# Patient Record
Sex: FEMALE | Race: WHITE | NOT HISPANIC OR LATINO | Employment: OTHER | URBAN - METROPOLITAN AREA
[De-identification: names, ages, dates, MRNs, and addresses within clinical notes are randomized per-mention and may not be internally consistent; named-entity substitution may affect disease eponyms.]

---

## 2018-10-09 ENCOUNTER — APPOINTMENT (OUTPATIENT)
Dept: RADIOLOGY | Facility: MEDICAL CENTER | Age: 83
DRG: 193 | End: 2018-10-09
Attending: EMERGENCY MEDICINE
Payer: MEDICARE

## 2018-10-09 ENCOUNTER — APPOINTMENT (OUTPATIENT)
Dept: RADIOLOGY | Facility: MEDICAL CENTER | Age: 83
DRG: 193 | End: 2018-10-09
Attending: HOSPITALIST
Payer: MEDICARE

## 2018-10-09 ENCOUNTER — HOSPITAL ENCOUNTER (INPATIENT)
Facility: MEDICAL CENTER | Age: 83
LOS: 18 days | DRG: 193 | End: 2018-10-27
Attending: EMERGENCY MEDICINE | Admitting: HOSPITALIST
Payer: MEDICARE

## 2018-10-09 DIAGNOSIS — J18.9 PNEUMONIA OF BOTH LUNGS DUE TO INFECTIOUS ORGANISM, UNSPECIFIED PART OF LUNG: ICD-10-CM

## 2018-10-09 DIAGNOSIS — G93.40 ENCEPHALOPATHY ACUTE: ICD-10-CM

## 2018-10-09 DIAGNOSIS — Z78.9 POLST (PHYSICIAN ORDERS FOR LIFE-SUSTAINING TREATMENT): ICD-10-CM

## 2018-10-09 DIAGNOSIS — J18.9 COMMUNITY ACQUIRED PNEUMONIA OF RIGHT LUNG, UNSPECIFIED PART OF LUNG: ICD-10-CM

## 2018-10-09 DIAGNOSIS — F03.91 DEMENTIA WITH BEHAVIORAL DISTURBANCE, UNSPECIFIED DEMENTIA TYPE: ICD-10-CM

## 2018-10-09 DIAGNOSIS — J96.01 ACUTE HYPOXEMIC RESPIRATORY FAILURE (HCC): ICD-10-CM
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
## 2018-10-09 DIAGNOSIS — I10 ESSENTIAL HYPERTENSION: ICD-10-CM

## 2018-10-09 PROBLEM — F03.90 DEMENTIA (HCC): Status: ACTIVE | Noted: 2018-10-09

## 2018-10-09 LAB
ANION GAP SERPL CALC-SCNC: 10 MMOL/L (ref 0–11.9)
BASOPHILS # BLD AUTO: 0.5 % (ref 0–1.8)
BASOPHILS # BLD: 0.05 K/UL (ref 0–0.12)
BUN SERPL-MCNC: 18 MG/DL (ref 8–22)
CALCIUM SERPL-MCNC: 9.1 MG/DL (ref 8.5–10.5)
CHLORIDE SERPL-SCNC: 100 MMOL/L (ref 96–112)
CO2 SERPL-SCNC: 26 MMOL/L (ref 20–33)
CREAT SERPL-MCNC: 0.6 MG/DL (ref 0.5–1.4)
EOSINOPHIL # BLD AUTO: 0.28 K/UL (ref 0–0.51)
EOSINOPHIL NFR BLD: 3 % (ref 0–6.9)
ERYTHROCYTE [DISTWIDTH] IN BLOOD BY AUTOMATED COUNT: 44.6 FL (ref 35.9–50)
GLUCOSE SERPL-MCNC: 106 MG/DL (ref 65–99)
HCT VFR BLD AUTO: 37 % (ref 37–47)
HGB BLD-MCNC: 12.4 G/DL (ref 12–16)
IMM GRANULOCYTES # BLD AUTO: 0.12 K/UL (ref 0–0.11)
IMM GRANULOCYTES NFR BLD AUTO: 1.3 % (ref 0–0.9)
LACTATE BLD-SCNC: 1 MMOL/L (ref 0.5–2)
LYMPHOCYTES # BLD AUTO: 0.89 K/UL (ref 1–4.8)
LYMPHOCYTES NFR BLD: 9.4 % (ref 22–41)
MCH RBC QN AUTO: 29.4 PG (ref 27–33)
MCHC RBC AUTO-ENTMCNC: 33.5 G/DL (ref 33.6–35)
MCV RBC AUTO: 87.7 FL (ref 81.4–97.8)
MONOCYTES # BLD AUTO: 0.76 K/UL (ref 0–0.85)
MONOCYTES NFR BLD AUTO: 8 % (ref 0–13.4)
NEUTROPHILS # BLD AUTO: 7.39 K/UL (ref 2–7.15)
NEUTROPHILS NFR BLD: 77.8 % (ref 44–72)
NRBC # BLD AUTO: 0 K/UL
NRBC BLD-RTO: 0 /100 WBC
PLATELET # BLD AUTO: 319 K/UL (ref 164–446)
PMV BLD AUTO: 9.8 FL (ref 9–12.9)
POTASSIUM SERPL-SCNC: 3.7 MMOL/L (ref 3.6–5.5)
RBC # BLD AUTO: 4.22 M/UL (ref 4.2–5.4)
SODIUM SERPL-SCNC: 136 MMOL/L (ref 135–145)
WBC # BLD AUTO: 9.5 K/UL (ref 4.8–10.8)

## 2018-10-09 PROCEDURE — 96376 TX/PRO/DX INJ SAME DRUG ADON: CPT

## 2018-10-09 PROCEDURE — 83605 ASSAY OF LACTIC ACID: CPT

## 2018-10-09 PROCEDURE — 70450 CT HEAD/BRAIN W/O DYE: CPT

## 2018-10-09 PROCEDURE — 96375 TX/PRO/DX INJ NEW DRUG ADDON: CPT

## 2018-10-09 PROCEDURE — 96367 TX/PROPH/DG ADDL SEQ IV INF: CPT

## 2018-10-09 PROCEDURE — A9270 NON-COVERED ITEM OR SERVICE: HCPCS | Performed by: HOSPITALIST

## 2018-10-09 PROCEDURE — 700105 HCHG RX REV CODE 258: Performed by: EMERGENCY MEDICINE

## 2018-10-09 PROCEDURE — 80048 BASIC METABOLIC PNL TOTAL CA: CPT

## 2018-10-09 PROCEDURE — 99223 1ST HOSP IP/OBS HIGH 75: CPT | Mod: AI | Performed by: HOSPITALIST

## 2018-10-09 PROCEDURE — 85025 COMPLETE CBC W/AUTO DIFF WBC: CPT

## 2018-10-09 PROCEDURE — 51798 US URINE CAPACITY MEASURE: CPT

## 2018-10-09 PROCEDURE — 770001 HCHG ROOM/CARE - MED/SURG/GYN PRIV*

## 2018-10-09 PROCEDURE — 96365 THER/PROPH/DIAG IV INF INIT: CPT

## 2018-10-09 PROCEDURE — 99285 EMERGENCY DEPT VISIT HI MDM: CPT

## 2018-10-09 PROCEDURE — 700105 HCHG RX REV CODE 258: Performed by: HOSPITALIST

## 2018-10-09 PROCEDURE — 700111 HCHG RX REV CODE 636 W/ 250 OVERRIDE (IP): Performed by: EMERGENCY MEDICINE

## 2018-10-09 PROCEDURE — 71045 X-RAY EXAM CHEST 1 VIEW: CPT

## 2018-10-09 PROCEDURE — 700111 HCHG RX REV CODE 636 W/ 250 OVERRIDE (IP): Performed by: HOSPITALIST

## 2018-10-09 PROCEDURE — 700102 HCHG RX REV CODE 250 W/ 637 OVERRIDE(OP): Performed by: HOSPITALIST

## 2018-10-09 PROCEDURE — 36415 COLL VENOUS BLD VENIPUNCTURE: CPT

## 2018-10-09 PROCEDURE — 87040 BLOOD CULTURE FOR BACTERIA: CPT | Mod: 91

## 2018-10-09 RX ORDER — DIVALPROEX SODIUM 125 MG/1
250 CAPSULE, COATED PELLETS ORAL 2 TIMES DAILY
COMMUNITY

## 2018-10-09 RX ORDER — AZITHROMYCIN 500 MG/1
500 INJECTION, POWDER, LYOPHILIZED, FOR SOLUTION INTRAVENOUS ONCE
Status: COMPLETED | OUTPATIENT
Start: 2018-10-09 | End: 2018-10-09

## 2018-10-09 RX ORDER — SODIUM CHLORIDE 9 MG/ML
INJECTION, SOLUTION INTRAVENOUS CONTINUOUS
Status: DISCONTINUED | OUTPATIENT
Start: 2018-10-09 | End: 2018-10-13

## 2018-10-09 RX ORDER — LORAZEPAM 1 MG/1
0.5 TABLET ORAL EVERY 4 HOURS PRN
COMMUNITY
End: 2018-10-09

## 2018-10-09 RX ORDER — SODIUM CHLORIDE 9 MG/ML
1000 INJECTION, SOLUTION INTRAVENOUS ONCE
Status: COMPLETED | OUTPATIENT
Start: 2018-10-09 | End: 2018-10-09

## 2018-10-09 RX ORDER — DIVALPROEX SODIUM 125 MG/1
250 CAPSULE, COATED PELLETS ORAL 2 TIMES DAILY
Status: DISCONTINUED | OUTPATIENT
Start: 2018-10-09 | End: 2018-10-27 | Stop reason: HOSPADM

## 2018-10-09 RX ORDER — POLYETHYLENE GLYCOL 3350 17 G/17G
17 POWDER, FOR SOLUTION ORAL DAILY
COMMUNITY

## 2018-10-09 RX ORDER — HYDROCHLOROTHIAZIDE 25 MG/1
25 TABLET ORAL DAILY
Status: ON HOLD | COMMUNITY
End: 2018-10-27

## 2018-10-09 RX ORDER — AZITHROMYCIN 250 MG/1
250 TABLET, FILM COATED ORAL DAILY
Status: DISCONTINUED | OUTPATIENT
Start: 2018-10-10 | End: 2018-10-09

## 2018-10-09 RX ORDER — ENALAPRILAT 1.25 MG/ML
1.25 INJECTION INTRAVENOUS EVERY 6 HOURS PRN
Status: DISCONTINUED | OUTPATIENT
Start: 2018-10-09 | End: 2018-10-27 | Stop reason: HOSPADM

## 2018-10-09 RX ORDER — HYDROCODONE BITARTRATE AND ACETAMINOPHEN 5; 325 MG/1; MG/1
1 TABLET ORAL EVERY 8 HOURS PRN
Status: DISCONTINUED | OUTPATIENT
Start: 2018-10-09 | End: 2018-10-27 | Stop reason: HOSPADM

## 2018-10-09 RX ORDER — QUETIAPINE FUMARATE 25 MG/1
25 TABLET, FILM COATED ORAL EVERY 8 HOURS PRN
Status: DISCONTINUED | OUTPATIENT
Start: 2018-10-09 | End: 2018-10-27 | Stop reason: HOSPADM

## 2018-10-09 RX ORDER — POLYETHYLENE GLYCOL 3350 17 G/17G
1 POWDER, FOR SOLUTION ORAL
Status: DISCONTINUED | OUTPATIENT
Start: 2018-10-09 | End: 2018-10-27 | Stop reason: HOSPADM

## 2018-10-09 RX ORDER — LORAZEPAM 0.5 MG/1
0.5 TABLET ORAL EVERY 6 HOURS PRN
Status: DISCONTINUED | OUTPATIENT
Start: 2018-10-09 | End: 2018-10-24

## 2018-10-09 RX ORDER — ONDANSETRON 2 MG/ML
4 INJECTION INTRAMUSCULAR; INTRAVENOUS EVERY 4 HOURS PRN
Status: DISCONTINUED | OUTPATIENT
Start: 2018-10-09 | End: 2018-10-25

## 2018-10-09 RX ORDER — POLYETHYLENE GLYCOL 3350 17 G/17G
1 POWDER, FOR SOLUTION ORAL DAILY
Status: DISCONTINUED | OUTPATIENT
Start: 2018-10-09 | End: 2018-10-09

## 2018-10-09 RX ORDER — AMOXICILLIN 250 MG
2 CAPSULE ORAL 2 TIMES DAILY
Status: DISCONTINUED | OUTPATIENT
Start: 2018-10-09 | End: 2018-10-27 | Stop reason: HOSPADM

## 2018-10-09 RX ORDER — ONDANSETRON 4 MG/1
4 TABLET, ORALLY DISINTEGRATING ORAL EVERY 4 HOURS PRN
Status: DISCONTINUED | OUTPATIENT
Start: 2018-10-09 | End: 2018-10-27 | Stop reason: HOSPADM

## 2018-10-09 RX ORDER — LORAZEPAM 0.5 MG/1
0.5 TABLET ORAL EVERY 6 HOURS PRN
Status: ON HOLD | COMMUNITY
End: 2018-10-27

## 2018-10-09 RX ORDER — ACETAMINOPHEN 325 MG/1
650 TABLET ORAL EVERY 6 HOURS PRN
Status: DISCONTINUED | OUTPATIENT
Start: 2018-10-09 | End: 2018-10-27 | Stop reason: HOSPADM

## 2018-10-09 RX ORDER — BISACODYL 10 MG
10 SUPPOSITORY, RECTAL RECTAL
Status: DISCONTINUED | OUTPATIENT
Start: 2018-10-09 | End: 2018-10-27 | Stop reason: HOSPADM

## 2018-10-09 RX ORDER — HALOPERIDOL 5 MG/ML
1 INJECTION INTRAMUSCULAR EVERY 4 HOURS PRN
Status: DISCONTINUED | OUTPATIENT
Start: 2018-10-09 | End: 2018-10-25

## 2018-10-09 RX ORDER — DOCUSATE SODIUM 100 MG/1
100 CAPSULE, LIQUID FILLED ORAL 2 TIMES DAILY
COMMUNITY

## 2018-10-09 RX ORDER — ALBUTEROL SULFATE 2.5 MG/3ML
2.5 SOLUTION RESPIRATORY (INHALATION) EVERY 6 HOURS PRN
COMMUNITY

## 2018-10-09 RX ORDER — HYDROCODONE BITARTRATE AND ACETAMINOPHEN 5; 325 MG/1; MG/1
1 TABLET ORAL EVERY 8 HOURS PRN
Status: ON HOLD | COMMUNITY
End: 2018-10-27

## 2018-10-09 RX ORDER — DOCUSATE SODIUM 100 MG/1
100 CAPSULE, LIQUID FILLED ORAL 2 TIMES DAILY
Status: DISCONTINUED | OUTPATIENT
Start: 2018-10-09 | End: 2018-10-09

## 2018-10-09 RX ORDER — LANOLIN ALCOHOL/MO/W.PET/CERES
6 CREAM (GRAM) TOPICAL
Status: DISCONTINUED | OUTPATIENT
Start: 2018-10-09 | End: 2018-10-09

## 2018-10-09 RX ORDER — AMLODIPINE BESYLATE 10 MG/1
10 TABLET ORAL DAILY
Status: ON HOLD | COMMUNITY
End: 2018-10-27

## 2018-10-09 RX ORDER — LANOLIN ALCOHOL/MO/W.PET/CERES
6 CREAM (GRAM) TOPICAL
Status: ON HOLD | COMMUNITY
End: 2018-10-27

## 2018-10-09 RX ADMIN — CEFTRIAXONE SODIUM 1 G: 1 INJECTION, POWDER, FOR SOLUTION INTRAMUSCULAR; INTRAVENOUS at 07:00

## 2018-10-09 RX ADMIN — HALOPERIDOL LACTATE 1 MG: 5 INJECTION, SOLUTION INTRAMUSCULAR at 16:41

## 2018-10-09 RX ADMIN — SODIUM CHLORIDE: 9 INJECTION, SOLUTION INTRAVENOUS at 13:00

## 2018-10-09 RX ADMIN — AMPICILLIN SODIUM AND SULBACTAM SODIUM 3 G: 2; 1 INJECTION, POWDER, FOR SOLUTION INTRAMUSCULAR; INTRAVENOUS at 22:59

## 2018-10-09 RX ADMIN — HALOPERIDOL LACTATE 1 MG: 5 INJECTION, SOLUTION INTRAMUSCULAR at 09:53

## 2018-10-09 RX ADMIN — AMPICILLIN SODIUM AND SULBACTAM SODIUM 3 G: 2; 1 INJECTION, POWDER, FOR SOLUTION INTRAMUSCULAR; INTRAVENOUS at 16:30

## 2018-10-09 RX ADMIN — AZITHROMYCIN FOR INJECTION INJECTION, POWDER, LYOPHILIZED, FOR SOLUTION 500 MG: 500 INJECTION INTRAVENOUS at 18:00

## 2018-10-09 RX ADMIN — SODIUM CHLORIDE 1000 ML: 9 INJECTION, SOLUTION INTRAVENOUS at 06:00

## 2018-10-09 RX ADMIN — Medication 5 DROP: at 23:00

## 2018-10-09 ASSESSMENT — COGNITIVE AND FUNCTIONAL STATUS - GENERAL
TURNING FROM BACK TO SIDE WHILE IN FLAT BAD: A LITTLE
SUGGESTED CMS G CODE MODIFIER MOBILITY: CL
CLIMB 3 TO 5 STEPS WITH RAILING: TOTAL
EATING MEALS: A LOT
TOILETING: A LOT
MOBILITY SCORE: 11
DAILY ACTIVITIY SCORE: 12
PERSONAL GROOMING: A LOT
DRESSING REGULAR LOWER BODY CLOTHING: A LOT
WALKING IN HOSPITAL ROOM: A LOT
STANDING UP FROM CHAIR USING ARMS: TOTAL
SUGGESTED CMS G CODE MODIFIER DAILY ACTIVITY: CL
DRESSING REGULAR UPPER BODY CLOTHING: A LOT
MOVING TO AND FROM BED TO CHAIR: A LOT
MOVING FROM LYING ON BACK TO SITTING ON SIDE OF FLAT BED: A LOT
HELP NEEDED FOR BATHING: A LOT

## 2018-10-09 ASSESSMENT — PAIN SCALES - PAIN ASSESSMENT IN ADVANCED DEMENTIA (PAINAD)
TOTALSCORE: 0
BODYLANGUAGE: RELAXED
FACIALEXPRESSION: SMILING OR INEXPRESSIVE
BREATHING: NORMAL
CONSOLABILITY: NO NEED TO CONSOLE

## 2018-10-09 ASSESSMENT — COPD QUESTIONNAIRES
DO YOU EVER COUGH UP ANY MUCUS OR PHLEGM?: NO/ONLY WITH OCCASIONAL COLDS OR INFECTIONS
HAVE YOU SMOKED AT LEAST 100 CIGARETTES IN YOUR ENTIRE LIFE: NO/DON'T KNOW
COPD SCREENING SCORE: 2
DURING THE PAST 4 WEEKS HOW MUCH DID YOU FEEL SHORT OF BREATH: NONE/LITTLE OF THE TIME

## 2018-10-09 ASSESSMENT — LIFESTYLE VARIABLES: EVER_SMOKED: UNABLE TO EVALUATE AT THIS TIME - NEEDS ASSESSMENT PRIOR TO DISCHARGE

## 2018-10-09 ASSESSMENT — PAIN SCALES - GENERAL: PAINLEVEL_OUTOF10: 0

## 2018-10-09 ASSESSMENT — ENCOUNTER SYMPTOMS
SPUTUM PRODUCTION: 0
FEVER: 0
COUGH: 0
DOUBLE VISION: 0
SORE THROAT: 0
BACK PAIN: 0
HEADACHES: 0
HEARTBURN: 0
LOSS OF CONSCIOUSNESS: 0
NAUSEA: 0
PALPITATIONS: 0
CHILLS: 0
BLURRED VISION: 0
FALLS: 0
DIZZINESS: 0
ABDOMINAL PAIN: 0
SHORTNESS OF BREATH: 0

## 2018-10-09 ASSESSMENT — PAIN SCALES - WONG BAKER: WONGBAKER_NUMERICALRESPONSE: HURTS EVEN MORE
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
## 2018-10-09 NOTE — ED NOTES
"Pt lying on left side with legs extended between side rails of gurney.  Pt re-positioned on gurney.  Pt awake, no apparent respiratory distress  Pt kept saying \"please don't hurt me\"  Pt reassured multiple times that the hospital staff is not going to hurt her, that the hospital staff wants to help her get better.  Pt repeatedly removes BP cuff and disconnects pulse ox connector cable.   "

## 2018-10-09 NOTE — ASSESSMENT & PLAN NOTE
At her baseline now with dementia  Acute on chronic  CT head unrevealing  Finished antibiotics,  dced lorazepam

## 2018-10-09 NOTE — ED NOTES
Pt unbuckled posey lap belt.  Pt sitting on gurney scooting herself to end of gurney.  Pt repositioned and blanket placed under pt for comfort.  [place] lab belt reapplied to pt for pt safety due to pt confusion and continuing attempting to get out of bed.

## 2018-10-09 NOTE — ED NOTES
I talked to Charge RN for [facility] 3rd floor, updated her on pt condition and to make sure room T333-02 is appropriate for pt.  Was told that room is not appropriate due to limited line of sight. The charge RN said she would contact bed board for more appropriate bed assignment.  ER Charge RN notified.
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
## 2018-10-09 NOTE — ED NOTES
Pt squirming in bed.  Pt asked for help, when asked what the pt wanted help with pt replied she wanted bread.  Pt told their was no bread in her room at this time, pt replied she did not want bread.  Pt asked where her  was.  Pt reoriented that she is the patient.
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
## 2018-10-09 NOTE — ASSESSMENT & PLAN NOTE
Patient has POLST which states comfort measures only.  The POLST was made when patient was not capacitated.  Discussed with  who called patient's son.   He said to continue treatment with IV antibiotics and IV fluids.  Palliative consult placed to readdress this, new POLST made
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
## 2018-10-09 NOTE — DISCHARGE PLANNING
Referral: POLST Clarification    Intervention: [facility].asked by Dr. Schmidt to clarify  POLST with family.  [facility] contacted Pt son Tian Lyn  at [phone] who is listed on POLST as her DPOA. He states Pt would want to be admitted and have IV antibiotics to treat Pneumonia. [facility]ERA clarified with Dr. Schmidt who will put a consult in with Palliative Care to review POLST as there is conflicting information.     Pt currently Resides at [address]  [phone]    Plan: Pt will be admitted to hospital.

## 2018-10-09 NOTE — ED NOTES
Pt yelling in bed asking where she is.  Pt reoriented to location and situation.  Pt stated she wants to go to her room.  Pt reoriented that she is on her room.  Pt states her  and son are looking for her.  Pt reoriented that her son was with her in this room earlier today.

## 2018-10-10 LAB
ALBUMIN SERPL BCP-MCNC: 3.2 G/DL (ref 3.2–4.9)
ALBUMIN/GLOB SERPL: 1 G/DL
ALP SERPL-CCNC: 56 U/L (ref 30–99)
ALT SERPL-CCNC: 11 U/L (ref 2–50)
ANION GAP SERPL CALC-SCNC: 11 MMOL/L (ref 0–11.9)
AST SERPL-CCNC: 18 U/L (ref 12–45)
BILIRUB SERPL-MCNC: 0.6 MG/DL (ref 0.1–1.5)
BUN SERPL-MCNC: 12 MG/DL (ref 8–22)
CALCIUM SERPL-MCNC: 8.1 MG/DL (ref 8.5–10.5)
CHLORIDE SERPL-SCNC: 101 MMOL/L (ref 96–112)
CO2 SERPL-SCNC: 25 MMOL/L (ref 20–33)
CREAT SERPL-MCNC: 0.55 MG/DL (ref 0.5–1.4)
ERYTHROCYTE [DISTWIDTH] IN BLOOD BY AUTOMATED COUNT: 44.5 FL (ref 35.9–50)
GLOBULIN SER CALC-MCNC: 3.2 G/DL (ref 1.9–3.5)
GLUCOSE SERPL-MCNC: 97 MG/DL (ref 65–99)
HCT VFR BLD AUTO: 35.5 % (ref 37–47)
HGB BLD-MCNC: 11.9 G/DL (ref 12–16)
MCH RBC QN AUTO: 29.3 PG (ref 27–33)
MCHC RBC AUTO-ENTMCNC: 33.5 G/DL (ref 33.6–35)
MCV RBC AUTO: 87.4 FL (ref 81.4–97.8)
PLATELET # BLD AUTO: 287 K/UL (ref 164–446)
PMV BLD AUTO: 9.8 FL (ref 9–12.9)
POTASSIUM SERPL-SCNC: 3 MMOL/L (ref 3.6–5.5)
PROT SERPL-MCNC: 6.4 G/DL (ref 6–8.2)
RBC # BLD AUTO: 4.06 M/UL (ref 4.2–5.4)
SODIUM SERPL-SCNC: 137 MMOL/L (ref 135–145)
WBC # BLD AUTO: 7.6 K/UL (ref 4.8–10.8)

## 2018-10-10 PROCEDURE — 85027 COMPLETE CBC AUTOMATED: CPT

## 2018-10-10 PROCEDURE — 92610 EVALUATE SWALLOWING FUNCTION: CPT

## 2018-10-10 PROCEDURE — 770001 HCHG ROOM/CARE - MED/SURG/GYN PRIV*

## 2018-10-10 PROCEDURE — 700111 HCHG RX REV CODE 636 W/ 250 OVERRIDE (IP): Performed by: HOSPITALIST

## 2018-10-10 PROCEDURE — 700105 HCHG RX REV CODE 258: Performed by: HOSPITALIST

## 2018-10-10 PROCEDURE — G8997 SWALLOW GOAL STATUS: HCPCS | Mod: CJ

## 2018-10-10 PROCEDURE — 700102 HCHG RX REV CODE 250 W/ 637 OVERRIDE(OP): Performed by: HOSPITALIST

## 2018-10-10 PROCEDURE — 99232 SBSQ HOSP IP/OBS MODERATE 35: CPT | Performed by: HOSPITALIST

## 2018-10-10 PROCEDURE — 80053 COMPREHEN METABOLIC PANEL: CPT

## 2018-10-10 PROCEDURE — 36415 COLL VENOUS BLD VENIPUNCTURE: CPT

## 2018-10-10 PROCEDURE — A9270 NON-COVERED ITEM OR SERVICE: HCPCS | Performed by: HOSPITALIST

## 2018-10-10 PROCEDURE — G8996 SWALLOW CURRENT STATUS: HCPCS | Mod: CK

## 2018-10-10 RX ADMIN — SODIUM CHLORIDE: 9 INJECTION, SOLUTION INTRAVENOUS at 21:09

## 2018-10-10 RX ADMIN — SODIUM CHLORIDE: 9 INJECTION, SOLUTION INTRAVENOUS at 04:53

## 2018-10-10 RX ADMIN — ENOXAPARIN SODIUM 40 MG: 100 INJECTION SUBCUTANEOUS at 04:45

## 2018-10-10 RX ADMIN — HALOPERIDOL LACTATE 1 MG: 5 INJECTION, SOLUTION INTRAMUSCULAR at 06:38

## 2018-10-10 RX ADMIN — AMPICILLIN SODIUM AND SULBACTAM SODIUM 3 G: 2; 1 INJECTION, POWDER, FOR SOLUTION INTRAMUSCULAR; INTRAVENOUS at 09:49

## 2018-10-10 RX ADMIN — AMPICILLIN SODIUM AND SULBACTAM SODIUM 3 G: 2; 1 INJECTION, POWDER, FOR SOLUTION INTRAMUSCULAR; INTRAVENOUS at 04:49

## 2018-10-10 RX ADMIN — AZITHROMYCIN FOR INJECTION INJECTION, POWDER, LYOPHILIZED, FOR SOLUTION 500 MG: 500 INJECTION INTRAVENOUS at 18:39

## 2018-10-10 RX ADMIN — SENNOSIDES AND DOCUSATE SODIUM 2 TABLET: 8.6; 5 TABLET ORAL at 09:47

## 2018-10-10 RX ADMIN — Medication 5 DROP: at 21:16

## 2018-10-10 RX ADMIN — AMPICILLIN SODIUM AND SULBACTAM SODIUM 3 G: 2; 1 INJECTION, POWDER, FOR SOLUTION INTRAMUSCULAR; INTRAVENOUS at 17:47

## 2018-10-10 RX ADMIN — DIVALPROEX SODIUM 250 MG: 125 CAPSULE, COATED PELLETS ORAL at 18:03

## 2018-10-10 RX ADMIN — DIVALPROEX SODIUM 250 MG: 125 CAPSULE, COATED PELLETS ORAL at 09:46

## 2018-10-10 RX ADMIN — AMPICILLIN SODIUM AND SULBACTAM SODIUM 3 G: 2; 1 INJECTION, POWDER, FOR SOLUTION INTRAMUSCULAR; INTRAVENOUS at 21:09

## 2018-10-10 ASSESSMENT — PAIN SCALES - GENERAL
PAINLEVEL_OUTOF10: 0
PAINLEVEL_OUTOF10: 0

## 2018-10-10 NOTE — PROGRESS NOTES
2 RN skin check completed with CHADWICK Dan.     Bruising noted to L thigh.  Small red area L FA, blanching.    Otherwise skin normal and intact.

## 2018-10-10 NOTE — PROGRESS NOTES
Pt agitated this am. Pt yelling and trying to undo her posey belt. PRN haldol given. Attempted to reorient pt, Pt repeatedly asking about her . Pt c/o back pain, repositioned with pillow which pt states helped. [place] belt checked for appropriate fit. Will continue to monitor.

## 2018-10-10 NOTE — PROGRESS NOTES
Dr. Elrdidge notified pt has strict NPO orders with PO meds ordered. Orders to change azithromycin to IV. Per MD ok to try another beside swallow eval if pt able, SLP ordered as well. Dr. Eldridge also notified pt has not voided and was unable to get up to commode or void per bedpan, orders for bladder scan and if result >500ml to straight cath. Will continue to monitor.
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
## 2018-10-10 NOTE — CARE PLAN
Problem: Safety  Goal: Will remain free from injury  Fall precautions in place. Bed in low locked position, belongings and call light within reach. Pt instructed to use call light for assistance. Bed alarm activated, pt in desk bed.
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
## 2018-10-10 NOTE — RESPIRATORY CARE
COPD EDUCATION by COPD CLINICAL EDUCATOR  10/10/2018 at 6:56 AM by Larisa Rodrigez     Patient reviewed by COPD education team. Patient does not qualify for COPD program.

## 2018-10-10 NOTE — THERAPY
"Speech Language Therapy Clinical Swallow Evaluation completed.  Functional Status: Patient strict NPO pending evaluation. Patient awake, alert, and restless, confused, and perseverating on getting out of bed and \"Chris\" coming to visit. Patient unable to follow directives [facility], but upon inspection, patient was noted to be edentulous and have upper denture plate. Patient with no other gross deficits noted upon inspection. Patient consumed PO trials of single ice chips, NTL via cup sip, purees, pudding, and thin liquids via cup sip x1. Patient presented with subtle throat clearing on thin liquids via cup sip and then refused any further trials. No overt s/sx of aspiration were noted on ice chips, NTL, purees, or pudding. Patient declined trials of soft solids. Laryngeal elevation palpated as weak. Initiation of swallow trigger was delayed 2-3 seconds but this appeared functional and is appropriate for stated age. At this time, recommend patient start Dys1/NTL diet with 1:1 feeding. RN aware. SLP to follow. Thank you for the consult.     Recommendations - Diet: Dysphagia I, Nectar Thick Liquid                          Strategies: Strict 1:1 feeding , No Straws and Head of Bed at 90 Degrees                          Medication Administration: Crush all Medications in Puree  Plan of Care: Will benefit from Speech Therapy 3 times per week  Post-Acute Therapy: Discharge to a transitional care facility for continued speech therapy services.    See \"Rehab Therapy-Acute\" Patient Summary Report for complete documentation.   "

## 2018-10-11 LAB — PROCALCITONIN SERPL-MCNC: <0.05 NG/ML

## 2018-10-11 PROCEDURE — 99233 SBSQ HOSP IP/OBS HIGH 50: CPT | Performed by: HOSPITALIST

## 2018-10-11 PROCEDURE — 84145 PROCALCITONIN (PCT): CPT

## 2018-10-11 PROCEDURE — A9270 NON-COVERED ITEM OR SERVICE: HCPCS | Performed by: HOSPITALIST

## 2018-10-11 PROCEDURE — 92526 ORAL FUNCTION THERAPY: CPT

## 2018-10-11 PROCEDURE — 36415 COLL VENOUS BLD VENIPUNCTURE: CPT

## 2018-10-11 PROCEDURE — 700105 HCHG RX REV CODE 258: Performed by: HOSPITALIST

## 2018-10-11 PROCEDURE — 770001 HCHG ROOM/CARE - MED/SURG/GYN PRIV*

## 2018-10-11 PROCEDURE — 700102 HCHG RX REV CODE 250 W/ 637 OVERRIDE(OP): Performed by: HOSPITALIST

## 2018-10-11 PROCEDURE — 700111 HCHG RX REV CODE 636 W/ 250 OVERRIDE (IP): Performed by: HOSPITALIST

## 2018-10-11 RX ORDER — DOXYCYCLINE 100 MG/1
100 TABLET ORAL EVERY 12 HOURS
Status: DISCONTINUED | OUTPATIENT
Start: 2018-10-11 | End: 2018-10-14

## 2018-10-11 RX ADMIN — ACETAMINOPHEN 650 MG: 325 TABLET, FILM COATED ORAL at 05:53

## 2018-10-11 RX ADMIN — ENOXAPARIN SODIUM 40 MG: 100 INJECTION SUBCUTANEOUS at 04:51

## 2018-10-11 RX ADMIN — DIVALPROEX SODIUM 250 MG: 125 CAPSULE, COATED PELLETS ORAL at 05:53

## 2018-10-11 RX ADMIN — AMPICILLIN SODIUM AND SULBACTAM SODIUM 3 G: 2; 1 INJECTION, POWDER, FOR SOLUTION INTRAMUSCULAR; INTRAVENOUS at 04:50

## 2018-10-11 RX ADMIN — AMPICILLIN SODIUM AND SULBACTAM SODIUM 3 G: 2; 1 INJECTION, POWDER, FOR SOLUTION INTRAMUSCULAR; INTRAVENOUS at 10:57

## 2018-10-11 RX ADMIN — DOXYCYCLINE 100 MG: 100 TABLET ORAL at 16:14

## 2018-10-11 RX ADMIN — Medication 5 DROP: at 21:10

## 2018-10-11 ASSESSMENT — PAIN SCALES - GENERAL
PAINLEVEL_OUTOF10: 0
PAINLEVEL_OUTOF10: 0

## 2018-10-11 NOTE — THERAPY
"Speech Language Therapy dysphagia treatment completed.   Functional Status: Patient currently on Dys1/NTL diet with 1:1 feeding. Per RN, patient appears to be tolerating diet without difficulty today. Patient's son, Chris, present at bedside at beginning of session. Patient awake, alert, and intermittently agitated, but cooperative. Patient very confused and requiring redirection to task frequently. Patient consumed PO trials from lunch tray of mashed potatoes w/ gravy and NTL via cup sip. Patient consumed all PO trials with no overt s/sx of aspiration. Vocal quality remained clear and initiation of swallow trigger was timely. Patient refused trials of soft solids today. Patient was able to complete Falsetto and Effortful swallow exercises x5 each today when given a model. At this time, recommend patient continue Dys1/NTL diet with 1:1 feeding. RN aware. SLP is following for diet tolerance.     Recommendations: At this time, recommend patient continue Dys1/NTL diet with 1:1 feeding.   Plan of Care: Will benefit from Speech Therapy 3 times per week  Post-Acute Therapy: Discharge to an inpatient transitional care facility for continued speech therapy services.    See \"Rehab Therapy-Acute\" Patient Summary Report for complete documentation.     "

## 2018-10-11 NOTE — PROGRESS NOTES
· 2 RN skin check complete by Hardeep  · Devices in place N/A (pt refuses SCDs).  · Skin assessed under devices N/A.  · Confirmed pressure ulcers found on N/A  · New potential pressure ulcers noted on  Left forearm a small blanching red area noted
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
## 2018-10-11 NOTE — CARE PLAN
Problem: Safety  Goal: Will remain free from injury  Fall precautions in place. Bed in low locked position, belongings and call light within reach. Patient instructed to use call light for assistance. Hourly rounding in place. Bed alarm activated, pt in desk bed.

## 2018-10-11 NOTE — PROGRESS NOTES
"Assumed care of pt at 1900. Pt A&O x2, vss, very [place] hearing aides in place. Pt pleasant, compliant in beginning of shift. [place] lap belt in place. During the night pt very agitated, yelling \"get out you pigs\" when attempting to clean her. Pt incontinent of stool x2, up to BSC x2 assist, voiding without issues. Pt c/o back pain this am, repositioned, given tylenol. Pt tolerated medications well. Fall precautions and hourly rounding in place.   "

## 2018-10-11 NOTE — CARE PLAN
Problem: Safety  Goal: Will remain free from injury  Outcome: PROGRESSING AS EXPECTED  Pt remains free from falls or injury this shift. Continue to assess risk and implement precautions as needed    Problem: Knowledge Deficit  Goal: Knowledge of disease process/condition, treatment plan, diagnostic tests, and medications will improve  Outcome: PROGRESSING SLOWER THAN EXPECTED  Pt does not process info about disease process or treatment plan and does not show signs of effective learning. Continue to educate on care plan and disease process

## 2018-10-12 LAB
ANION GAP SERPL CALC-SCNC: 9 MMOL/L (ref 0–11.9)
BUN SERPL-MCNC: 7 MG/DL (ref 8–22)
CALCIUM SERPL-MCNC: 8.6 MG/DL (ref 8.5–10.5)
CHLORIDE SERPL-SCNC: 100 MMOL/L (ref 96–112)
CO2 SERPL-SCNC: 27 MMOL/L (ref 20–33)
CREAT SERPL-MCNC: 0.48 MG/DL (ref 0.5–1.4)
GLUCOSE SERPL-MCNC: 106 MG/DL (ref 65–99)
POTASSIUM SERPL-SCNC: 2.8 MMOL/L (ref 3.6–5.5)
SODIUM SERPL-SCNC: 136 MMOL/L (ref 135–145)

## 2018-10-12 PROCEDURE — G8979 MOBILITY GOAL STATUS: HCPCS | Mod: CJ

## 2018-10-12 PROCEDURE — 97162 PT EVAL MOD COMPLEX 30 MIN: CPT

## 2018-10-12 PROCEDURE — 36415 COLL VENOUS BLD VENIPUNCTURE: CPT

## 2018-10-12 PROCEDURE — G8987 SELF CARE CURRENT STATUS: HCPCS | Mod: CM

## 2018-10-12 PROCEDURE — 700102 HCHG RX REV CODE 250 W/ 637 OVERRIDE(OP): Performed by: HOSPITALIST

## 2018-10-12 PROCEDURE — A9270 NON-COVERED ITEM OR SERVICE: HCPCS | Performed by: HOSPITALIST

## 2018-10-12 PROCEDURE — 99232 SBSQ HOSP IP/OBS MODERATE 35: CPT | Performed by: HOSPITALIST

## 2018-10-12 PROCEDURE — 700111 HCHG RX REV CODE 636 W/ 250 OVERRIDE (IP): Performed by: HOSPITALIST

## 2018-10-12 PROCEDURE — 97166 OT EVAL MOD COMPLEX 45 MIN: CPT

## 2018-10-12 PROCEDURE — G8978 MOBILITY CURRENT STATUS: HCPCS | Mod: CM

## 2018-10-12 PROCEDURE — 770001 HCHG ROOM/CARE - MED/SURG/GYN PRIV*

## 2018-10-12 PROCEDURE — G8988 SELF CARE GOAL STATUS: HCPCS | Mod: CK

## 2018-10-12 PROCEDURE — 80048 BASIC METABOLIC PNL TOTAL CA: CPT

## 2018-10-12 RX ORDER — HALOPERIDOL 1 MG/1
0.5 TABLET ORAL ONCE
Status: COMPLETED | OUTPATIENT
Start: 2018-10-12 | End: 2018-10-12

## 2018-10-12 RX ORDER — POTASSIUM CHLORIDE 7.45 MG/ML
10 INJECTION INTRAVENOUS
Status: COMPLETED | OUTPATIENT
Start: 2018-10-12 | End: 2018-10-12

## 2018-10-12 RX ORDER — POTASSIUM CHLORIDE 20 MEQ/1
20 TABLET, EXTENDED RELEASE ORAL 2 TIMES DAILY
Status: COMPLETED | OUTPATIENT
Start: 2018-10-12 | End: 2018-10-14

## 2018-10-12 RX ADMIN — POTASSIUM CHLORIDE 10 MEQ: 7.46 INJECTION, SOLUTION INTRAVENOUS at 02:14

## 2018-10-12 RX ADMIN — HALOPERIDOL LACTATE 1 MG: 5 INJECTION, SOLUTION INTRAMUSCULAR at 01:11

## 2018-10-12 RX ADMIN — DIVALPROEX SODIUM 250 MG: 125 CAPSULE, COATED PELLETS ORAL at 17:09

## 2018-10-12 RX ADMIN — DOXYCYCLINE 100 MG: 100 TABLET ORAL at 17:09

## 2018-10-12 RX ADMIN — ENALAPRILAT 1.25 MG: 1.25 INJECTION INTRAVENOUS at 04:50

## 2018-10-12 RX ADMIN — POTASSIUM CHLORIDE 10 MEQ: 7.46 INJECTION, SOLUTION INTRAVENOUS at 16:02

## 2018-10-12 RX ADMIN — POTASSIUM CHLORIDE 10 MEQ: 7.46 INJECTION, SOLUTION INTRAVENOUS at 03:33

## 2018-10-12 RX ADMIN — HALOPERIDOL 0.5 MG: 1 TABLET ORAL at 12:37

## 2018-10-12 RX ADMIN — POTASSIUM CHLORIDE 10 MEQ: 7.46 INJECTION, SOLUTION INTRAVENOUS at 17:07

## 2018-10-12 RX ADMIN — POTASSIUM CHLORIDE 20 MEQ: 1500 TABLET, EXTENDED RELEASE ORAL at 17:09

## 2018-10-12 RX ADMIN — Medication 5 DROP: at 21:00

## 2018-10-12 RX ADMIN — POTASSIUM CHLORIDE 10 MEQ: 7.46 INJECTION, SOLUTION INTRAVENOUS at 18:21

## 2018-10-12 RX ADMIN — ENOXAPARIN SODIUM 40 MG: 100 INJECTION SUBCUTANEOUS at 05:01

## 2018-10-12 RX ADMIN — POTASSIUM CHLORIDE 10 MEQ: 7.46 INJECTION, SOLUTION INTRAVENOUS at 17:00

## 2018-10-12 ASSESSMENT — COGNITIVE AND FUNCTIONAL STATUS - GENERAL
MOVING TO AND FROM BED TO CHAIR: UNABLE
DRESSING REGULAR LOWER BODY CLOTHING: TOTAL
DAILY ACTIVITIY SCORE: 6
HELP NEEDED FOR BATHING: TOTAL
EATING MEALS: TOTAL
MOBILITY SCORE: 6
SUGGESTED CMS G CODE MODIFIER DAILY ACTIVITY: CN
CLIMB 3 TO 5 STEPS WITH RAILING: TOTAL
SUGGESTED CMS G CODE MODIFIER MOBILITY: CN
WALKING IN HOSPITAL ROOM: TOTAL
STANDING UP FROM CHAIR USING ARMS: TOTAL
TOILETING: TOTAL
DRESSING REGULAR UPPER BODY CLOTHING: TOTAL
TURNING FROM BACK TO SIDE WHILE IN FLAT BAD: UNABLE
PERSONAL GROOMING: TOTAL
MOVING FROM LYING ON BACK TO SITTING ON SIDE OF FLAT BED: UNABLE

## 2018-10-12 ASSESSMENT — PATIENT HEALTH QUESTIONNAIRE - PHQ9
SUM OF ALL RESPONSES TO PHQ9 QUESTIONS 1 AND 2: 0
1. LITTLE INTEREST OR PLEASURE IN DOING THINGS: NOT AT ALL
2. FEELING DOWN, DEPRESSED, IRRITABLE, OR HOPELESS: NOT AT ALL

## 2018-10-12 ASSESSMENT — PAIN SCALES - GENERAL
PAINLEVEL_OUTOF10: 0
PAINLEVEL_OUTOF10: 0

## 2018-10-12 ASSESSMENT — ACTIVITIES OF DAILY LIVING (ADL): TOILETING: REQUIRES ASSIST

## 2018-10-12 ASSESSMENT — GAIT ASSESSMENTS: GAIT LEVEL OF ASSIST: UNABLE TO PARTICIPATE

## 2018-10-12 NOTE — PROGRESS NOTES
"Pt. Received from day shift, pt. Is awake and alert. Refuses to answer orientation questions, verbally aggressive towards this Rn stating \"you don't do anything for me, get me a nurse\" Reoriented pt.about the unit but still kept yelling \"nurse, nurse nurse\". Pt. Refused to take her medications for tonight, and did not want to eat her dinner tray also. Pt. Noted to be on a lap belt, needs attended.    "

## 2018-10-12 NOTE — CARE PLAN
Problem: Infection  Goal: Will remain free from infection  Outcome: PROGRESSING AS EXPECTED  Administered scheduled IV antibiotic. No signs of worsening infection at this time.    Problem: Skin Integrity  Goal: Risk for impaired skin integrity will decrease  Outcome: PROGRESSING AS EXPECTED  Q2 turns. Checking frequently for incontinence.

## 2018-10-12 NOTE — DISCHARGE PLANNING
Anticipated Discharge Disposition: Home?    Action: Discussed patient's needs during IDT rounds.  Patient is in soft restraints after pulling her IV out, continues to be disorientated.  PT/OT has been ordered    Barriers to Discharge: medical clearance and plan of care    Plan: follow up with family

## 2018-10-12 NOTE — PROGRESS NOTES
"Received bedside report from night shift RN. Pt A&O x2 (disorientated to event and time). Pt on soft wrist restraints 2 hr checks provided. Bed alarm on. Pt denies pain or discomfort at this moment. Pt mentioned, \"Why do I have these on? Can you cut them?\" RN and SN educated pt the reason soft wrist restraints were place on. Assessment performed. Bed locked and lowest position, treaded socks on, and call light within reach. Hourly rounding in place.   "

## 2018-10-12 NOTE — CARE PLAN
Problem: Communication  Goal: The ability to communicate needs accurately and effectively will improve  Outcome: PROGRESSING AS EXPECTED  Pt encouraged to voice concerns and feeling, hourly rounding in place.     Problem: Skin Integrity  Goal: Risk for impaired skin integrity will decrease  Outcome: PROGRESSING AS EXPECTED  Pt is Q2h turns, soft wrist restraint Q2h checks in place.

## 2018-10-12 NOTE — CARE PLAN
Problem: Safety  Goal: Will remain free from falls  Outcome: PROGRESSING AS EXPECTED  Bed alarm in place, call light placed within reach. Pt. Is on a desk bed.     Problem: Bowel/Gastric:  Goal: Normal bowel function is maintained or improved  Outcome: PROGRESSING AS EXPECTED  Noted to have loose stools but otherwise did not look like anything concerning, held her stool softeners for today.     Problem: Psychosocial Needs:  Goal: Level of anxiety will decrease  Outcome: PROGRESSING AS EXPECTED  Reoriented pt. Provided a listening ear to pt. Concerns, tried to calm her down.

## 2018-10-12 NOTE — THERAPY
"Occupational Therapy Evaluation completed.   Functional Status:  Max-total assist w/all activity, pt was AOx1, incontinent in bed, total assist w/selene care, impaired by cognition. Sat EOB, required total assist w/stand and assist BTB   Plan of Care: Will benefit from Occupational Therapy 2 times per week  Discharge Recommendations:  Equipment: Will Continue to Assess for Equipment Needs. Post-acute therapy Discharge to a transitional care facility for continued skilled therapy services.    See \"Rehab Therapy-Acute\" Patient Summary Report for complete documentation.      89 yr old female admitted for GLF, hx of advanced dementia, found to have PNA. Pt appears to have generalized weakness, and worsening confusion. Son was present and reports pt is not at PLOF, current recommend post acute placement prior to d/c home   "

## 2018-10-12 NOTE — CARE PLAN
Problem: Safety  Goal: Will remain free from injury  Outcome: PROGRESSING AS EXPECTED  Bilateral restraints on the wrists applied to help her prevent injury as she is trying to pull out her PIV. Coban placed on the IV, MD updated and restraints applied per MD order.

## 2018-10-12 NOTE — THERAPY
"Physical Therapy Evaluation completed.   Bed Mobility:  Supine to Sit: Total Assist 2/2 initiation, assisting with LEs  Transfers: Sit to Stand: Total Assist X 2  Gait: Level Of Assist: Unable to Participate     Plan of Care: Will benefit from Physical Therapy 3 times per week  Discharge Recommendations: Equipment: Will Continue to Assess for Equipment Needs.     Pt presents with impaired command following, dynamic balance and motor planning associated with acute on chronic cognitive deficits with hx of dementia. Pt very pleasant and agreeable this session; short term memory deficits impeding initiation and carryover but son at bedside reporting she was ambulatory with a FWW at [facility] which she is not able to currently demonstrate. Pt would benefit from placement after hospital discharge to return to ambulatory baseline;    See \"Rehab Therapy-Acute\" Patient Summary Report for complete documentation.     "

## 2018-10-12 NOTE — PROGRESS NOTES
"Pt. Resting at this time, but still screams and yells \"nurse, come here, come here. Untie me, I can't breathe\" reassessed lap belt restraints and appears to be actually looser. Turned pt. And cleaned her up as she soiled the linen. CNA and RN changed the pt.   "

## 2018-10-12 NOTE — PROGRESS NOTES
AAOx1, self - dementia. Declining pain intervention at this time. +BS in all 4 quadrants, hyperactive. Lap belt in use, restraint order current. 2x person assist, unsteady. Incontinent. POC discussed, denies further needs at this time. Bed alarm on, call light within reach & hourly rounding in place.

## 2018-10-12 NOTE — PROGRESS NOTES
"Pt. Noted to be up and awake, started to be taking off the coban of the IV that was placed. When RN asked why she was taking it off she said \"I'm hurting\". Coban was loosened but then pt. Still kept taking off coban and pulling the IV tubing despite reorientation and education. MD updated and received order for restraints on both wrists.   "

## 2018-10-12 NOTE — PROGRESS NOTES
"Pt. Still restless, pulled out PIV on her L upper arm, changed her linens. Still verbally aggressive towards this RN stating \" You don't know what you're doing, get out of here get me a nurse\".   "

## 2018-10-13 PROBLEM — E87.6 HYPOKALEMIA DUE TO INADEQUATE POTASSIUM INTAKE: Status: ACTIVE | Noted: 2018-10-13

## 2018-10-13 LAB
ANION GAP SERPL CALC-SCNC: 8 MMOL/L (ref 0–11.9)
BUN SERPL-MCNC: 8 MG/DL (ref 8–22)
CALCIUM SERPL-MCNC: 8.4 MG/DL (ref 8.5–10.5)
CHLORIDE SERPL-SCNC: 106 MMOL/L (ref 96–112)
CO2 SERPL-SCNC: 25 MMOL/L (ref 20–33)
CREAT SERPL-MCNC: 0.48 MG/DL (ref 0.5–1.4)
GLUCOSE SERPL-MCNC: 94 MG/DL (ref 65–99)
MAGNESIUM SERPL-MCNC: 1.7 MG/DL (ref 1.5–2.5)
POTASSIUM SERPL-SCNC: 3.5 MMOL/L (ref 3.6–5.5)
SODIUM SERPL-SCNC: 139 MMOL/L (ref 135–145)

## 2018-10-13 PROCEDURE — 36415 COLL VENOUS BLD VENIPUNCTURE: CPT

## 2018-10-13 PROCEDURE — A9270 NON-COVERED ITEM OR SERVICE: HCPCS | Performed by: HOSPITALIST

## 2018-10-13 PROCEDURE — 700102 HCHG RX REV CODE 250 W/ 637 OVERRIDE(OP): Performed by: HOSPITALIST

## 2018-10-13 PROCEDURE — 83735 ASSAY OF MAGNESIUM: CPT

## 2018-10-13 PROCEDURE — 700105 HCHG RX REV CODE 258: Performed by: HOSPITALIST

## 2018-10-13 PROCEDURE — 80048 BASIC METABOLIC PNL TOTAL CA: CPT

## 2018-10-13 PROCEDURE — 99233 SBSQ HOSP IP/OBS HIGH 50: CPT | Performed by: HOSPITALIST

## 2018-10-13 PROCEDURE — 770001 HCHG ROOM/CARE - MED/SURG/GYN PRIV*

## 2018-10-13 PROCEDURE — 700111 HCHG RX REV CODE 636 W/ 250 OVERRIDE (IP): Performed by: HOSPITALIST

## 2018-10-13 RX ORDER — POLYVINYL ALCOHOL 14 MG/ML
1 SOLUTION/ DROPS OPHTHALMIC
Status: DISCONTINUED | OUTPATIENT
Start: 2018-10-13 | End: 2018-10-13

## 2018-10-13 RX ADMIN — DIVALPROEX SODIUM 250 MG: 125 CAPSULE, COATED PELLETS ORAL at 18:09

## 2018-10-13 RX ADMIN — DIVALPROEX SODIUM 250 MG: 125 CAPSULE, COATED PELLETS ORAL at 09:12

## 2018-10-13 RX ADMIN — SENNOSIDES AND DOCUSATE SODIUM 2 TABLET: 8.6; 5 TABLET ORAL at 18:09

## 2018-10-13 RX ADMIN — POTASSIUM CHLORIDE 20 MEQ: 1500 TABLET, EXTENDED RELEASE ORAL at 09:13

## 2018-10-13 RX ADMIN — ENOXAPARIN SODIUM 40 MG: 100 INJECTION SUBCUTANEOUS at 09:13

## 2018-10-13 RX ADMIN — DOXYCYCLINE 100 MG: 100 TABLET ORAL at 18:09

## 2018-10-13 RX ADMIN — DOXYCYCLINE 100 MG: 100 TABLET ORAL at 09:12

## 2018-10-13 RX ADMIN — SODIUM CHLORIDE: 9 INJECTION, SOLUTION INTRAVENOUS at 04:31

## 2018-10-13 RX ADMIN — HYDROCODONE BITARTRATE AND ACETAMINOPHEN 1 TABLET: 5; 325 TABLET ORAL at 19:57

## 2018-10-13 RX ADMIN — Medication 5 DROP: at 21:00

## 2018-10-13 RX ADMIN — POTASSIUM CHLORIDE 20 MEQ: 1500 TABLET, EXTENDED RELEASE ORAL at 18:09

## 2018-10-13 ASSESSMENT — PAIN SCALES - GENERAL
PAINLEVEL_OUTOF10: 5
PAINLEVEL_OUTOF10: 0
PAINLEVEL_OUTOF10: 0
PAINLEVEL_OUTOF10: 5

## 2018-10-13 ASSESSMENT — PAIN SCALES - PAIN ASSESSMENT IN ADVANCED DEMENTIA (PAINAD)
BODYLANGUAGE: RELAXED
CONSOLABILITY: NO NEED TO CONSOLE
FACIALEXPRESSION: SMILING OR INEXPRESSIVE
BREATHING: NORMAL
BODYLANGUAGE: RELAXED
BREATHING: NORMAL
TOTALSCORE: 0
TOTALSCORE: 0
FACIALEXPRESSION: SMILING OR INEXPRESSIVE
CONSOLABILITY: NO NEED TO CONSOLE

## 2018-10-13 NOTE — PROGRESS NOTES
Patient's bed is not lowering. Patient is on restraints sleeping well. Staff in charge of fixing bed contacted and made aware.

## 2018-10-13 NOTE — CARE PLAN
Problem: Safety  Goal: Will remain free from injury  Outcome: PROGRESSING AS EXPECTED  Fall precautions in place. Treaded socks on pt. Appropriate signs on doorway. IV pole on same side as bathroom. Bedrails up. Bed in lowest position and locked.  Call light and phone within reach. Patient educated on importance of calling nurses before getting out of bed, verbalizes understanding. Bed alarm on.    Problem: Skin Integrity  Goal: Risk for impaired skin integrity will decrease  Outcome: PROGRESSING AS EXPECTED  q2hr turns in place. Pt was cleaned during incontinent episodes, and soiled linen removed promptly. Made sure pt's skin remained dry and intact during shift.

## 2018-10-13 NOTE — PROGRESS NOTES
[facility] Medicine Daily Progress Note    Date of Service  10/12/2018    Chief Complaint  89 y.o. female admitted 10/9/2018 with dementia admitted after a GLF at nursing and found to have RLL infiltrate on CXR, admitted for further treatment.     Interval Problem Update  10/10--patient sleeping soundly in bed, awakes to voice.  Unintelligible speech, reports being hard of hearing and references family members not in room.  Denies chest pain or shortness of breath, although oriented x0.  Reports some RN about patient being agitated overnight and in the morning trying to undo [place] belt  10/11--patient remains about the same, maybe a little more cooperative today.  Palliative care spoke with son, changes to post and CODE STATUS made.    Consultants/Specialty  Palliative Care    Code Status  DNR/DNI however wanting treatment options, this is new change    Disposition  Pending, will discuss this a.m. around, will need PT and OT evaluation for possible SNF placement    Review of Systems  Review of Systems   Unable to perform ROS: Dementia        Physical Exam  Temp:  [36.6 °C (97.8 °F)-37.3 °C (99.2 °F)] 37.3 °C (99.2 °F)  Pulse:  [67-83] 71  Resp:  [17-18] 18  BP: (148-185)/(66-94) 158/70    Physical Exam   Constitutional: No distress.   HENT:   Head: Normocephalic.   Mouth/Throat: Oropharynx is clear and moist.   Eyes: Conjunctivae are normal. No scleral icterus.   Neck: Normal range of motion.   Cardiovascular: Normal heart sounds and intact distal pulses.    Pulmonary/Chest: Effort normal. No stridor. No respiratory distress. She has no wheezes.   Abdominal: Soft. She exhibits no distension. There is no guarding.   Musculoskeletal: She exhibits no edema, tenderness or deformity.   Neurological: She is alert.   Skin: Skin is warm and dry. No rash noted. She is not diaphoretic. No erythema.   Nursing note and vitals reviewed.      Fluids    Intake/Output Summary (Last 24 hours) at 10/12/18 1236  Last data filed at  10/12/18 1400   Gross per 24 hour   Intake               50 ml   Output                0 ml   Net               50 ml       Laboratory  Recent Labs      10/10/18   0047   WBC  7.6   RBC  4.06*   HEMOGLOBIN  11.9*   HEMATOCRIT  35.5*   MCV  87.4   MCH  29.3   MCHC  33.5*   RDW  44.5   PLATELETCT  287   MPV  9.8     Recent Labs      10/10/18   0047  10/12/18   1133   SODIUM  137  136   POTASSIUM  3.0*  2.8*   CHLORIDE  101  100   CO2  25  27   GLUCOSE  97  106*   BUN  12  7*   CREATININE  0.55  0.48*   CALCIUM  8.1*  8.6                   Imaging  CT-HEAD W/O   Final Result      No definite acute intracranial abnormality is identified.      Atrophy      There are periventricular and subcortical white matter changes present.  This finding is nonspecific and could be from previous small vessel ischemia, demyelination, or gliosis.      DX-CHEST-PORTABLE (1 VIEW)   Final Result         1.  Hazy right infrahilar density suggests subtle infiltrate.   2.  Cardiomegaly   3.  Atherosclerosis           Assessment/Plan  * Community acquired pneumonia of right lung   Assessment & Plan    Chest x-ray reviewed by me showing increased right infrahilar consolidation  Patient requiring supplemental oxygen  Started on Unasyn and azithromycin as cannot rule out possible aspiration pneumonia considering patient's mental status  Started on IV fluids, RT protocol  Pro-calcitonin negative, lactic acid normal  P.o. intake improving, will transition to p.o. antibiotics with doxycycline and date 10/14/2018        POLST (Physician Orders for Life-Sustaining Treatment)- (present on admission)   Assessment & Plan    Patient has POLST which states comfort measures only.  The POLST was made when patient was not capacitated.  Discussed with  who called patient's son.   He said to continue treatment with IV antibiotics and IV fluids.  Palliative consult placed to readdress this, new POLST made        Fall from ground level   Assessment &  Plan    Patient was fighting with another resident at nursing facility for a walker  This resulted in ground-level fall  CT head negative for bleed        Encephalopathy acute   Assessment & Plan    Patient does have underlying dementia but appears to be more confused  Likely due to pneumonia  CT head unrevealing        Acute hypoxemic respiratory failure (HCC)- (present on admission)   Assessment & Plan    Currently requiring supplemental oxygen due to above but improving  Continue RT protocol and antibiotics        Dementia- (present on admission)   Assessment & Plan    Unclear of patient's baseline        Essential hypertension- (present on admission)   Assessment & Plan    Holding patient's amlodipine and hydrochlorothiazide due to borderline low blood pressure and in setting of infection.  Resume when tolerated.  As needed BP med orders in place             VTE prophylaxis: Heparin

## 2018-10-13 NOTE — PROGRESS NOTES
Pt refusing 2 RN skin assessment at this time. Educated pt of the need to assess her skin every shift to prevent pressure ulcers as she scored a 15 on the akila score assessment scale. Pt verbally understands the risks and does not want to woken up at this time.

## 2018-10-13 NOTE — PROGRESS NOTES
2RN skin assessment completed with CHADWICK Juarez. Patient presents slight redness on coccyx area that is blanching. Bruising in BUE and BLE. Otherwise skin is intact. Patient is on restraints, skin assessed under restraints and it looks intact. No signs of injury noted.

## 2018-10-13 NOTE — PROGRESS NOTES
[facility] Medicine Daily Progress Note    Date of Service  10/13/2018    Chief Complaint  89 y.o. female admitted 10/9/2018 with dementia admitted after a GLF at nursing and found to have RLL infiltrate on CXR, admitted for further treatment.     Interval Problem Update  10/10--patient sleeping soundly in bed, awakes to voice.  Unintelligible speech, reports being hard of hearing and references family members not in room.  Denies chest pain or shortness of breath, although oriented x0.  Reports some RN about patient being agitated overnight and in the morning trying to undo [place] belt  10/11--patient remains about the same, maybe a little more cooperative today.  Palliative care spoke with son, changes to post and CODE STATUS made.    Consultants/Specialty  Palliative Care    Code Status  DNR/DNI however wanting treatment options, this is new change    Disposition  Pending, will discuss this a.m. around, will need PT and OT evaluation for possible SNF placement    Review of Systems  Review of Systems   Unable to perform ROS: Dementia        Physical Exam  Temp:  [36.6 °C (97.8 °F)-37.3 °C (99.2 °F)] 36.7 °C (98.1 °F)  Pulse:  [62-71] 62  Resp:  [17-20] 20  BP: (112-158)/(43-70) 151/54    Physical Exam   Constitutional: No distress.   HENT:   Head: Normocephalic.   Mouth/Throat: Oropharynx is clear and moist.   Eyes: Conjunctivae are normal. No scleral icterus.   Neck: Normal range of motion.   Cardiovascular: Normal heart sounds and intact distal pulses.    Pulmonary/Chest: Effort normal. No stridor. No respiratory distress. She has no wheezes.   Abdominal: Soft. She exhibits no distension. There is no tenderness.   Musculoskeletal: She exhibits no edema, tenderness or deformity.   Neurological: She is alert. No cranial nerve deficit.   Skin: Skin is warm and dry. She is not diaphoretic. No erythema.   Psychiatric:   Angry, oriented x0   Nursing note and vitals reviewed.      Fluids    Intake/Output Summary (Last 24  hours) at 10/13/18 1111  Last data filed at 10/13/18 0901   Gross per 24 hour   Intake              650 ml   Output                0 ml   Net              650 ml       Laboratory      Recent Labs      10/12/18   1133  10/13/18   0807   SODIUM  136  139   POTASSIUM  2.8*  3.5*   CHLORIDE  100  106   CO2  27  25   GLUCOSE  106*  94   BUN  7*  8   CREATININE  0.48*  0.48*   CALCIUM  8.6  8.4*                   Imaging  CT-HEAD W/O   Final Result      No definite acute intracranial abnormality is identified.      Atrophy      There are periventricular and subcortical white matter changes present.  This finding is nonspecific and could be from previous small vessel ischemia, demyelination, or gliosis.      DX-CHEST-PORTABLE (1 VIEW)   Final Result         1.  Hazy right infrahilar density suggests subtle infiltrate.   2.  Cardiomegaly   3.  Atherosclerosis           Assessment/Plan  * Community acquired pneumonia of right lung   Assessment & Plan    Chest x-ray reviewed by me showing increased right infrahilar consolidation  Patient requiring supplemental oxygen  Started on Unasyn and azithromycin as cannot rule out possible aspiration pneumonia considering patient's mental status  Started on IV fluids, RT protocol  Pro-calcitonin negative, lactic acid normal  P.o. intake improving, will transition to p.o. antibiotics with doxycycline and date 10/14/2018        POLST (Physician Orders for Life-Sustaining Treatment)- (present on admission)   Assessment & Plan    Patient has POLST which states comfort measures only.  The POLST was made when patient was not capacitated.  Discussed with  who called patient's son.   He said to continue treatment with IV antibiotics and IV fluids.  Palliative consult placed to readdress this, new POLST made        Fall from ground level- (present on admission)   Assessment & Plan    Patient was fighting with another resident at nursing facility for a walker  This resulted in  ground-level fall  CT head negative for bleed        Encephalopathy acute- (present on admission)   Assessment & Plan    Patient does have underlying dementia but appears to be more confused, remains very confrontational today  Potentially worsened by her pneumonia  CT head unrevealing  Continue antibiotics, observe closely        Acute hypoxemic respiratory failure (HCC)- (present on admission)   Assessment & Plan    No longer requiring oxygen, resolving  Continue RT protocol and antibiotics        Hypokalemia due to inadequate potassium intake- (present on admission)   Assessment & Plan    Suspected due to poor p.o. Intake  Replete  Follow-up BMP in a.m.        Dementia- (present on admission)   Assessment & Plan    Unclear of patient's baseline        Essential hypertension- (present on admission)   Assessment & Plan    Holding patient's amlodipine and hydrochlorothiazide due to borderline low blood pressure and in setting of infection.  Resume when tolerated.  As needed BP med orders in place             VTE prophylaxis: Heparin

## 2018-10-13 NOTE — PROGRESS NOTES
Dr. Olsen paged to reorder soft restraints expiring in about an hour and clarification on Lab order being discontinued yesterday. Pt's last K result was 2.8, no new labs have been drawn since yesterday. Dr. Olsen updated and will put new restraints and lab orders in.

## 2018-10-13 NOTE — PROGRESS NOTES
Report received by zully RN. Assumed care of pt. Assessment complete, whiteboard updated. Pt A&Ox1 (to self). Pt in no apparent signs of distress. Plan of care discussed. Call light within reach, bed in lowest position, and pt has no further questions at this time.

## 2018-10-14 LAB
ANION GAP SERPL CALC-SCNC: 7 MMOL/L (ref 0–11.9)
BACTERIA BLD CULT: NORMAL
BACTERIA BLD CULT: NORMAL
BUN SERPL-MCNC: 13 MG/DL (ref 8–22)
CALCIUM SERPL-MCNC: 8.6 MG/DL (ref 8.5–10.5)
CHLORIDE SERPL-SCNC: 103 MMOL/L (ref 96–112)
CO2 SERPL-SCNC: 25 MMOL/L (ref 20–33)
CREAT SERPL-MCNC: 0.56 MG/DL (ref 0.5–1.4)
GLUCOSE SERPL-MCNC: 113 MG/DL (ref 65–99)
POTASSIUM SERPL-SCNC: 3.8 MMOL/L (ref 3.6–5.5)
SIGNIFICANT IND 70042: NORMAL
SIGNIFICANT IND 70042: NORMAL
SITE SITE: NORMAL
SITE SITE: NORMAL
SODIUM SERPL-SCNC: 135 MMOL/L (ref 135–145)
SOURCE SOURCE: NORMAL
SOURCE SOURCE: NORMAL

## 2018-10-14 PROCEDURE — A9270 NON-COVERED ITEM OR SERVICE: HCPCS | Performed by: HOSPITALIST

## 2018-10-14 PROCEDURE — 770001 HCHG ROOM/CARE - MED/SURG/GYN PRIV*

## 2018-10-14 PROCEDURE — 700111 HCHG RX REV CODE 636 W/ 250 OVERRIDE (IP): Performed by: HOSPITALIST

## 2018-10-14 PROCEDURE — 80048 BASIC METABOLIC PNL TOTAL CA: CPT

## 2018-10-14 PROCEDURE — 36415 COLL VENOUS BLD VENIPUNCTURE: CPT

## 2018-10-14 PROCEDURE — 700102 HCHG RX REV CODE 250 W/ 637 OVERRIDE(OP): Performed by: HOSPITALIST

## 2018-10-14 PROCEDURE — 99232 SBSQ HOSP IP/OBS MODERATE 35: CPT | Performed by: HOSPITALIST

## 2018-10-14 RX ADMIN — DOXYCYCLINE 100 MG: 100 TABLET ORAL at 06:33

## 2018-10-14 RX ADMIN — DIVALPROEX SODIUM 250 MG: 125 CAPSULE, COATED PELLETS ORAL at 06:33

## 2018-10-14 RX ADMIN — SENNOSIDES AND DOCUSATE SODIUM 2 TABLET: 8.6; 5 TABLET ORAL at 17:48

## 2018-10-14 RX ADMIN — POTASSIUM CHLORIDE 20 MEQ: 1500 TABLET, EXTENDED RELEASE ORAL at 06:00

## 2018-10-14 RX ADMIN — ENOXAPARIN SODIUM 40 MG: 100 INJECTION SUBCUTANEOUS at 06:00

## 2018-10-14 RX ADMIN — HALOPERIDOL LACTATE 1 MG: 5 INJECTION, SOLUTION INTRAMUSCULAR at 00:18

## 2018-10-14 RX ADMIN — DIVALPROEX SODIUM 250 MG: 125 CAPSULE, COATED PELLETS ORAL at 17:48

## 2018-10-14 RX ADMIN — Medication 5 DROP: at 21:00

## 2018-10-14 ASSESSMENT — PAIN SCALES - PAIN ASSESSMENT IN ADVANCED DEMENTIA (PAINAD)
CONSOLABILITY: NO NEED TO CONSOLE
BODYLANGUAGE: RELAXED
BREATHING: NORMAL
TOTALSCORE: 0
FACIALEXPRESSION: SMILING OR INEXPRESSIVE

## 2018-10-14 ASSESSMENT — PATIENT HEALTH QUESTIONNAIRE - PHQ9
1. LITTLE INTEREST OR PLEASURE IN DOING THINGS: NOT AT ALL
2. FEELING DOWN, DEPRESSED, IRRITABLE, OR HOPELESS: NOT AT ALL
SUM OF ALL RESPONSES TO PHQ9 QUESTIONS 1 AND 2: 0

## 2018-10-14 ASSESSMENT — PAIN SCALES - WONG BAKER: WONGBAKER_NUMERICALRESPONSE: DOESN'T HURT AT ALL

## 2018-10-14 ASSESSMENT — PAIN SCALES - GENERAL
PAINLEVEL_OUTOF10: 0

## 2018-10-14 NOTE — PROGRESS NOTES
Bedside report received, pt care assumed, whiteboard updated.  Pt reports mild back pain 5/10, medicated per MAR. Bed in lowest position, bed alarm on pt educated on fall risk and verbalized understanding, call light within reach.

## 2018-10-14 NOTE — PROGRESS NOTES
Assumed care. Pt sitting up in bed. A/o to self and place. Pt appears to be more calm and alert this morning. This RN explained to pt the reason for hospitalization. Pt is q2 turn. Assessment completed with no significant findings.   Bed alarm in use, call light and personal belongings within reach, bed kept low, treaded socks on. Assisted as necessary. Kept rested and comfortable at all times. Hourly rounds.

## 2018-10-14 NOTE — CARE PLAN
Problem: Safety  Goal: Will remain free from injury  Safety precautions and fall prevention in place. Room close to nurse station, strip bed alarm in place, threaded socks on, bed at lowest position, call light within reach, hourly rounds.     Problem: Skin Integrity  Goal: Risk for impaired skin integrity will decrease  q2 turn implement, waffle mattress in place, skin assessment qshift, extra pillows for support.

## 2018-10-14 NOTE — PROGRESS NOTES
[facility] Medicine Daily Progress Note    Date of Service  10/14/2018    Chief Complaint  89 y.o. female admitted 10/9/2018 with dementia admitted after a GLF at nursing and found to have RLL infiltrate on CXR, admitted for further treatment.     Interval Problem Update  10/10--patient sleeping soundly in bed, awakes to voice.  Unintelligible speech, reports being hard of hearing and references family members not in room.  Denies chest pain or shortness of breath, although oriented x0.  Reports some RN about patient being agitated overnight and in the morning trying to undo [place] belt  10/11--patient remains about the same, maybe a little more cooperative today.  Palliative care spoke with son, changes to polst and CODE STATUS made.  10/14--no change today.  Reviewed chart, no fever overnight.  Discussed with Hilda RN, patient has been refusing all medications, will not even eat in applesauce.  Hypokalemia improved.  Had not been fully treated with doxycycline due to patient refusal.  Spoke with son yesterday at length, updated about care plan, informed we would know more about placement on Monday.    Consultants/Specialty  Palliative Care    Code Status  DNR/DNI however wanting treatment options, this is new change    Disposition  Pending    Review of Systems  Review of Systems   Unable to perform ROS: Dementia        Physical Exam  Temp:  [36.4 °C (97.5 °F)-37.3 °C (99.1 °F)] 36.4 °C (97.5 °F)  Pulse:  [62-80] 74  Resp:  [16-20] 20  BP: (131-174)/(45-63) 152/62    Physical Exam   Constitutional: She appears well-developed. No distress.   HENT:   Head: Normocephalic.   Mouth/Throat: Oropharynx is clear and moist.   Eyes: Conjunctivae are normal. No scleral icterus.   Neck: Normal range of motion.   Pulmonary/Chest: Effort normal. No stridor. No respiratory distress. She has no wheezes.   Abdominal: Soft. She exhibits no distension. There is no tenderness.   Musculoskeletal: She exhibits no edema, tenderness or  deformity.   Neurological: She is alert. No cranial nerve deficit.   Skin: Skin is warm and dry. She is not diaphoretic. No erythema.   Psychiatric:   alert but unable to assess orientation   Nursing note and vitals reviewed.      Fluids    Intake/Output Summary (Last 24 hours) at 10/14/18 1005  Last data filed at 10/14/18 0931   Gross per 24 hour   Intake              750 ml   Output                0 ml   Net              750 ml       Laboratory      Recent Labs      10/12/18   1133  10/13/18   0807  10/14/18   0055   SODIUM  136  139  135   POTASSIUM  2.8*  3.5*  3.8   CHLORIDE  100  106  103   CO2  27  25  25   GLUCOSE  106*  94  113*   BUN  7*  8  13   CREATININE  0.48*  0.48*  0.56   CALCIUM  8.6  8.4*  8.6                   Imaging  CT-HEAD W/O   Final Result      No definite acute intracranial abnormality is identified.      Atrophy      There are periventricular and subcortical white matter changes present.  This finding is nonspecific and could be from previous small vessel ischemia, demyelination, or gliosis.      DX-CHEST-PORTABLE (1 VIEW)   Final Result         1.  Hazy right infrahilar density suggests subtle infiltrate.   2.  Cardiomegaly   3.  Atherosclerosis           Assessment/Plan  * Community acquired pneumonia of right lung   Assessment & Plan    Chest x-ray reviewed by me showing increased right infrahilar consolidation  Patient required supplemental oxygen  Started on Unasyn and azithromycin as cannot rule out possible aspiration pneumonia considering patient's mental status  Started on IV fluids, RT protocol  Pro-calcitonin negative, lactic acid normal  Finished doxycycline course, end date 10/14/2018        POLST (Physician Orders for Life-Sustaining Treatment)- (present on admission)   Assessment & Plan    Patient has POLST which states comfort measures only.  The POLST was made when patient was not capacitated.  Discussed with  who called patient's son.   He said to continue  treatment with IV antibiotics and IV fluids.  Palliative consult placed to readdress this, new POLST made        Fall from ground level- (present on admission)   Assessment & Plan    Patient was fighting with another resident at nursing facility for a walker  This resulted in ground-level fall  CT head negative for bleed        Encephalopathy acute- (present on admission)   Assessment & Plan    Patient does have underlying dementia but appears to be more confused, remains very confrontational today  Potentially worsened by her pneumonia  CT head unrevealing  Finished antibiotics, observe closely        Acute hypoxemic respiratory failure (HCC)- (present on admission)   Assessment & Plan    No longer requiring oxygen, resolving  Continue RT protocol        Hypokalemia due to inadequate potassium intake- (present on admission)   Assessment & Plan    Suspected due to poor p.o. Intake  Resolved        Dementia- (present on admission)   Assessment & Plan    Unclear of patient's baseline  Disposition pending, will discuss with son tomorrow        Essential hypertension- (present on admission)   Assessment & Plan    Holding patient's amlodipine and hydrochlorothiazide due to borderline low blood pressure and in setting of infection.  Resume when tolerated.  As needed BP med orders in place             VTE prophylaxis: Heparin

## 2018-10-14 NOTE — PROGRESS NOTES
Patient's son, Tian, called for an update on patient's condition. Updated provided. Tian states she is going out of town to sell patient's mobile home and will be returning in two days.

## 2018-10-14 NOTE — PROGRESS NOTES
· 2 RN skin check complete with CHADWICK Hernandez.  · Devices in place: low airloss mattress, 2 point wrist restraints due to pulling out IV's.  · Skin assessed, and intact.  No areas of skin breakdown noted. Pt is incontinent with bowel and bladder.  · The following interventions in place: 2Qhr turns, low airloss mattress, and frequent changing during incontinent episodes so that pt's skin remains dry and intact during shift.

## 2018-10-15 PROCEDURE — 99232 SBSQ HOSP IP/OBS MODERATE 35: CPT | Performed by: HOSPITALIST

## 2018-10-15 PROCEDURE — 700111 HCHG RX REV CODE 636 W/ 250 OVERRIDE (IP): Performed by: HOSPITALIST

## 2018-10-15 PROCEDURE — A9270 NON-COVERED ITEM OR SERVICE: HCPCS | Performed by: HOSPITALIST

## 2018-10-15 PROCEDURE — 700102 HCHG RX REV CODE 250 W/ 637 OVERRIDE(OP): Performed by: HOSPITALIST

## 2018-10-15 PROCEDURE — 770006 HCHG ROOM/CARE - MED/SURG/GYN SEMI*

## 2018-10-15 RX ADMIN — HYDROCODONE BITARTRATE AND ACETAMINOPHEN 1 TABLET: 5; 325 TABLET ORAL at 20:12

## 2018-10-15 RX ADMIN — HYDROCODONE BITARTRATE AND ACETAMINOPHEN 1 TABLET: 5; 325 TABLET ORAL at 08:26

## 2018-10-15 RX ADMIN — Medication 5 DROP: at 20:12

## 2018-10-15 RX ADMIN — DIVALPROEX SODIUM 250 MG: 125 CAPSULE, COATED PELLETS ORAL at 05:43

## 2018-10-15 RX ADMIN — DIVALPROEX SODIUM 250 MG: 125 CAPSULE, COATED PELLETS ORAL at 18:39

## 2018-10-15 RX ADMIN — ENOXAPARIN SODIUM 40 MG: 100 INJECTION SUBCUTANEOUS at 05:43

## 2018-10-15 ASSESSMENT — PAIN SCALES - PAIN ASSESSMENT IN ADVANCED DEMENTIA (PAINAD)
CONSOLABILITY: NO NEED TO CONSOLE
FACIALEXPRESSION: FACIAL GRIMACING
BREATHING: NORMAL
BODYLANGUAGE: RELAXED
FACIALEXPRESSION: SMILING OR INEXPRESSIVE
BODYLANGUAGE: TENSE, DISTRESSED PACING, FIDGETING
CONSOLABILITY: NO NEED TO CONSOLE
TOTALSCORE: 0
BREATHING: NORMAL
TOTALSCORE: 4
NEGVOCALIZATION: OCCASIONAL MOAN/GROAN, LOW SPEECH, NEGATIVE/DISAPPROVING QUALITY

## 2018-10-15 ASSESSMENT — PAIN SCALES - GENERAL
PAINLEVEL_OUTOF10: 5
PAINLEVEL_OUTOF10: 0
PAINLEVEL_OUTOF10: ASSUMED PAIN PRESENT

## 2018-10-15 NOTE — PROGRESS NOTES
· 2 RN skin check done with CHADWICK Kent.   · Devices in place: 2 point wrist restraints for impulsiveness, and low airloss mattress.   · Skin assessed under devices, and are intact. No signs of skin breakdown. Pt is incontinent with bowel and bladder.   · The following interventions in place: 2Qhr turns/restraint documenting, low airloss mattress, disposable incontinent pads, and making sure pt's skin remains dry and intact during incontinent episodes.

## 2018-10-15 NOTE — PROGRESS NOTES
Bedside report received, pt care assumed, whiteboard updated.  Pt denies any additional needs at this time. Bed in lowest position, bed alarm on, and call light within reach.

## 2018-10-16 PROCEDURE — 302255 BARRIER CREAM MOISTURE BAZA PROTECT (ZINC) 5OZ: Performed by: INTERNAL MEDICINE

## 2018-10-16 PROCEDURE — 700102 HCHG RX REV CODE 250 W/ 637 OVERRIDE(OP): Performed by: HOSPITALIST

## 2018-10-16 PROCEDURE — 302112 WASHCLOTH,PERINEAL CARE: Performed by: INTERNAL MEDICINE

## 2018-10-16 PROCEDURE — 99232 SBSQ HOSP IP/OBS MODERATE 35: CPT | Performed by: INTERNAL MEDICINE

## 2018-10-16 PROCEDURE — 700111 HCHG RX REV CODE 636 W/ 250 OVERRIDE (IP): Performed by: HOSPITALIST

## 2018-10-16 PROCEDURE — A9270 NON-COVERED ITEM OR SERVICE: HCPCS | Performed by: HOSPITALIST

## 2018-10-16 PROCEDURE — 97530 THERAPEUTIC ACTIVITIES: CPT

## 2018-10-16 PROCEDURE — 770006 HCHG ROOM/CARE - MED/SURG/GYN SEMI*

## 2018-10-16 PROCEDURE — 92526 ORAL FUNCTION THERAPY: CPT

## 2018-10-16 PROCEDURE — 307059 PAD,EAR PROTECTOR: Performed by: INTERNAL MEDICINE

## 2018-10-16 RX ADMIN — Medication 5 DROP: at 19:48

## 2018-10-16 RX ADMIN — DIVALPROEX SODIUM 250 MG: 125 CAPSULE, COATED PELLETS ORAL at 05:36

## 2018-10-16 RX ADMIN — SENNOSIDES AND DOCUSATE SODIUM 2 TABLET: 8.6; 5 TABLET ORAL at 05:35

## 2018-10-16 RX ADMIN — ACETAMINOPHEN 650 MG: 325 TABLET, FILM COATED ORAL at 18:42

## 2018-10-16 RX ADMIN — ENOXAPARIN SODIUM 40 MG: 100 INJECTION SUBCUTANEOUS at 05:35

## 2018-10-16 RX ADMIN — DIVALPROEX SODIUM 250 MG: 125 CAPSULE, COATED PELLETS ORAL at 18:00

## 2018-10-16 ASSESSMENT — COGNITIVE AND FUNCTIONAL STATUS - GENERAL
STANDING UP FROM CHAIR USING ARMS: TOTAL
TURNING FROM BACK TO SIDE WHILE IN FLAT BAD: UNABLE
WALKING IN HOSPITAL ROOM: TOTAL
MOVING FROM LYING ON BACK TO SITTING ON SIDE OF FLAT BED: UNABLE
CLIMB 3 TO 5 STEPS WITH RAILING: TOTAL
MOBILITY SCORE: 6
MOVING TO AND FROM BED TO CHAIR: UNABLE
SUGGESTED CMS G CODE MODIFIER MOBILITY: CN

## 2018-10-16 ASSESSMENT — PAIN SCALES - GENERAL
PAINLEVEL_OUTOF10: 0
PAINLEVEL_OUTOF10: 0
PAINLEVEL_OUTOF10: ASSUMED PAIN PRESENT

## 2018-10-16 ASSESSMENT — GAIT ASSESSMENTS: GAIT LEVEL OF ASSIST: UNABLE TO PARTICIPATE
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
## 2018-10-16 NOTE — CARE PLAN
Problem: Safety  Goal: Will remain free from injury  Safety precautions and fall prevention in place. Bed alarm in place, bed at lowest position, threaded socks on, call light within reach. Aspiration precautions in place.     Problem: Skin Integrity  Goal: Risk for impaired skin integrity will decrease  Repositioned q2, waffle mattress in place. Pillows for support.

## 2018-10-16 NOTE — PROGRESS NOTES
2 point soft wrist restraints removed at 0900. Patient was calm and cooperative. Patient was found multiple times trying to exit the bed without assistance. Education provided; patient non-compliant. 2 point soft wrist restraints reapplied to patient with active order at 1400. Norco given for right lower quadrant pain; pain resolved. Alert and oriented to self only.

## 2018-10-16 NOTE — CARE PLAN
Problem: Safety  Goal: Will remain free from injury  Outcome: PROGRESSING AS EXPECTED  No falls or injuries    Problem: Pain Management  Goal: Pain level will decrease to patient's comfort goal  Outcome: PROGRESSING AS EXPECTED  No signs or symptoms of pain.

## 2018-10-16 NOTE — PROGRESS NOTES
· 2 RN skin check complete with RN Danita.   · Devices in place NC.   · Skin assessed under devices: yes, blanching redness behind ear lobes. Tubing protectors placed.  · Confirmed pressure ulcers found on: N/A.  · New potential pressure ulcers noted: n/a.  · The following interventions in place: waffle mattress, repositioned every 2 hours, extra pillows in place for support. Pillow under legs to float heels. Oxygen tube protectors in place.   · Blanching redness noted to coccyx area.
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
## 2018-10-16 NOTE — THERAPY
"Speech Language Therapy dysphagia treatment completed.   Functional Status: Patient currently on Dys1/NTL diet and seen with lunch tray. Patient was provided with 1:1 feeding by this clinician. Patient consumed approximately 50% of lunch tray with direct feeding from this SLP. Patient consumed all PO trials with no overt s/sx of aspiration. Intermittent prolonged oral holding was noted on both purees and NTL, but when cued verbally to \"swallow\", patient did so within 2-3 seconds. Patient was given PO trials of soft solids today. Patient presented with oral holding, prolonged mastication, and eventually had to spit out soft solids into napkin with this SLP's assistance. At this time, recommend patient continue Dys1/NTL diet with 1:1 feeding. Crush meds in puree. RN aware. SLP is following.     Recommendations: At this time, recommend patient continue Dys1/NTL diet with 1:1 feeding. Crush meds in puree. No straws.   Plan of Care: Will benefit from Speech Therapy 3 times per week  Post-Acute Therapy: Discharge to an inpatient transitional care facility for continued speech therapy services.    See \"Rehab Therapy-Acute\" Patient Summary Report for complete documentation.     "

## 2018-10-16 NOTE — CARE PLAN
Problem: Safety  Goal: Will remain free from injury  Outcome: PROGRESSING AS EXPECTED  Pt is close to nurses station.  Safety maintained

## 2018-10-16 NOTE — THERAPY
"Physical Therapy Treatment completed.   Bed Mobility:  Supine to Sit: Total Assist  Transfers: Sit to Stand: Total Assist X 2  Gait: Level Of Assist: Unable to Participate   Plan of Care: Will benefit from Physical Therapy 3 times per week  Discharge Recommendations: Equipment: Will Continue to Assess for Equipment Needs.     See \"Rehab Therapy-Acute\" Patient Summary Report for complete documentation.     Pt continues to present w/ decreased functional mobility. The only thing pt initiated mobility wise was rolling for pericare. Outside of that, pt did not follow and cues. Pt w/ delayed to no response to cues w/ a very flat affect. Pt required TotalA for all mobility and unable to maintain task for long. Pt did maintain WB through LE's for standing but had no balance at all. Per RN pt has been trying to get OOB w/out assist and was ambulatory PTA. With how pt presents w/ therapy, she will benefit from post acute therapy prior to NIDIA.  "

## 2018-10-16 NOTE — PROGRESS NOTES
Assumed care, pt lying bed , awake. Pt assisted to sit up in bed and get ready for breakfast. Pt is 1:1 feeding. Pt repositioned with pillow to left side. Pt on 2L O2 via NC. No distress noted. Pt appears calm. Discussed plan of care. Bed alarm in use, call light and personal belongings within reach, bed kept low, treaded socks on. Assisted as necessary. Kept rested and comfortable at all times. Hourly rounds.

## 2018-10-17 LAB
ANION GAP SERPL CALC-SCNC: 7 MMOL/L (ref 0–11.9)
BUN SERPL-MCNC: 17 MG/DL (ref 8–22)
CALCIUM SERPL-MCNC: 9.3 MG/DL (ref 8.5–10.5)
CHLORIDE SERPL-SCNC: 102 MMOL/L (ref 96–112)
CO2 SERPL-SCNC: 30 MMOL/L (ref 20–33)
CREAT SERPL-MCNC: 0.53 MG/DL (ref 0.5–1.4)
ERYTHROCYTE [DISTWIDTH] IN BLOOD BY AUTOMATED COUNT: 42 FL (ref 35.9–50)
GLUCOSE SERPL-MCNC: 110 MG/DL (ref 65–99)
HCT VFR BLD AUTO: 37.6 % (ref 37–47)
HGB BLD-MCNC: 12.2 G/DL (ref 12–16)
MCH RBC QN AUTO: 28.5 PG (ref 27–33)
MCHC RBC AUTO-ENTMCNC: 32.4 G/DL (ref 33.6–35)
MCV RBC AUTO: 87.9 FL (ref 81.4–97.8)
PLATELET # BLD AUTO: 317 K/UL (ref 164–446)
PMV BLD AUTO: 9.7 FL (ref 9–12.9)
POTASSIUM SERPL-SCNC: 3.9 MMOL/L (ref 3.6–5.5)
RBC # BLD AUTO: 4.28 M/UL (ref 4.2–5.4)
SODIUM SERPL-SCNC: 139 MMOL/L (ref 135–145)
WBC # BLD AUTO: 6.1 K/UL (ref 4.8–10.8)

## 2018-10-17 PROCEDURE — 85027 COMPLETE CBC AUTOMATED: CPT

## 2018-10-17 PROCEDURE — 99232 SBSQ HOSP IP/OBS MODERATE 35: CPT | Performed by: INTERNAL MEDICINE

## 2018-10-17 PROCEDURE — 700102 HCHG RX REV CODE 250 W/ 637 OVERRIDE(OP): Performed by: HOSPITALIST

## 2018-10-17 PROCEDURE — 700111 HCHG RX REV CODE 636 W/ 250 OVERRIDE (IP): Performed by: HOSPITALIST

## 2018-10-17 PROCEDURE — 770006 HCHG ROOM/CARE - MED/SURG/GYN SEMI*

## 2018-10-17 PROCEDURE — 36415 COLL VENOUS BLD VENIPUNCTURE: CPT

## 2018-10-17 PROCEDURE — A9270 NON-COVERED ITEM OR SERVICE: HCPCS | Performed by: HOSPITALIST

## 2018-10-17 PROCEDURE — 80048 BASIC METABOLIC PNL TOTAL CA: CPT

## 2018-10-17 RX ADMIN — Medication 5 DROP: at 19:43

## 2018-10-17 RX ADMIN — HYDROCODONE BITARTRATE AND ACETAMINOPHEN 1 TABLET: 5; 325 TABLET ORAL at 07:44

## 2018-10-17 RX ADMIN — DIVALPROEX SODIUM 250 MG: 125 CAPSULE, COATED PELLETS ORAL at 05:20

## 2018-10-17 RX ADMIN — ENOXAPARIN SODIUM 40 MG: 100 INJECTION SUBCUTANEOUS at 05:20

## 2018-10-17 RX ADMIN — ACETAMINOPHEN 650 MG: 325 TABLET, FILM COATED ORAL at 05:20

## 2018-10-17 RX ADMIN — DIVALPROEX SODIUM 250 MG: 125 CAPSULE, COATED PELLETS ORAL at 16:46

## 2018-10-17 ASSESSMENT — PAIN SCALES - GENERAL
PAINLEVEL_OUTOF10: 0
PAINLEVEL_OUTOF10: 0
PAINLEVEL_OUTOF10: ASSUMED PAIN PRESENT

## 2018-10-17 NOTE — PALLIATIVE CARE
Palliative Care follow-up  Discussed with Manjit VANG, appreciate updates.    Called Tian, had a long discussion regarding pt's clinical picture, Tian expressed struggling with realizing that pt might be at end of life, that her dementia has now progressed to the point where it might be time for hospice. Tian stated that he brought pt's walker in the other day thinking he was going to take pt home. When he saw the pt and discussed updates with the doctors, it is then that he realized that pt probably won't be getting better.     Discussed hospice back at [facility], Tian stated that he spoke with [facility] about that and they are willing to bring pt back on hospice if that is the GOC. Tian stated that he has plans to speak with Manjit and the doctor on Friday and will make a decision about hospice or not at that time.       Updated:   CIERA Saravia    Plan:   Continue to support pt and family, continue [facility] discussion regarding hospice.     Thank you for allowing Palliative Care to participate in this patient's care. Please feel free to call [phone] with any questions or concerns.
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
## 2018-10-17 NOTE — PROGRESS NOTES
· 2 RN skin check complete.   · Devices in place NC.  · Skin assessed under devices: mild blanchable redness behind ears.  · Mild blanchable redness to perianal area  · Small old scab below left elbow  · The following interventions in place: q 2hr turns, sussy cream to buttocks and groin, gray foam on O2 tubing, pillows for positioning and floating heels..

## 2018-10-17 NOTE — CARE PLAN
Problem: Pain Management  Goal: Pain level will decrease to patient's comfort goal  Pain assessment completed. Prn pain med administered per MAR. Pt repositioned and made comfortable.     Problem: Skin Integrity  Goal: Risk for impaired skin integrity will decrease  Skin assessment completed q shift, skin assessment under restraints completed q2h. Pt repositioned q2h, pillows placed for support of bony areas at risk for skin breakdown. Waffle mattress in place. Heels floating.

## 2018-10-17 NOTE — PROGRESS NOTES
Patient sitting up in bed. Reports pain but is unable to rate it. Norco crushed and administered with apple sauce. Aspiration precaution in place. 2L O2 via NC in place. Pt denies any SOB or difficulty breathing. Pt on soft restraints to LIZZIE wrists. Fluids provided.   Bed alarm in use, call light and personal belongings within reach, bed kept low, treaded socks on. Assisted as necessary. Kept rested and comfortable at all times. Hourly rounds.

## 2018-10-17 NOTE — CARE PLAN
Problem: Safety - Medical Restraint  Goal: Remains free of injury from restraints (Restraint for Interference with Medical Device)  INTERVENTIONS:  1. Determine that other, less restrictive measures have been tried or would not be effective before applying the restraint  2. Evaluate the patient's condition at the time of restraint application  3. Inform patient/family regarding the reason for restraint  4. Q2H: Monitor safety, psychosocial status, comfort, nutrition and hydration     Outcome: PROGRESSING AS EXPECTED      Problem: Safety  Goal: Will remain free from falls  Outcome: PROGRESSING AS EXPECTED

## 2018-10-17 NOTE — PROGRESS NOTES
· 2 RN skin check complete with CHADWICK Love.   · Devices in place NC.   · Skin assessed under devices: yes, blanching redness behind ear lobes. Tubing protectors placed.  · Confirmed pressure ulcers found on: N/A.  · New potential pressure ulcers noted: n/a.  · The following interventions in place: waffle mattress, repositioned every 2 hours, extra pillows in place for support. Pillow under legs to float heels. Oxygen tube protectors in place.   · Blanching redness noted to coccyx area. And old scab to L elbow.

## 2018-10-18 PROCEDURE — 99232 SBSQ HOSP IP/OBS MODERATE 35: CPT | Performed by: INTERNAL MEDICINE

## 2018-10-18 PROCEDURE — 97535 SELF CARE MNGMENT TRAINING: CPT

## 2018-10-18 PROCEDURE — 700102 HCHG RX REV CODE 250 W/ 637 OVERRIDE(OP): Performed by: HOSPITALIST

## 2018-10-18 PROCEDURE — A9270 NON-COVERED ITEM OR SERVICE: HCPCS | Performed by: HOSPITALIST

## 2018-10-18 PROCEDURE — 302146: Performed by: HOSPITALIST

## 2018-10-18 PROCEDURE — 700111 HCHG RX REV CODE 636 W/ 250 OVERRIDE (IP): Performed by: HOSPITALIST

## 2018-10-18 PROCEDURE — 770006 HCHG ROOM/CARE - MED/SURG/GYN SEMI*

## 2018-10-18 RX ADMIN — LORAZEPAM 0.5 MG: 0.5 TABLET ORAL at 23:43

## 2018-10-18 RX ADMIN — DIVALPROEX SODIUM 250 MG: 125 CAPSULE, COATED PELLETS ORAL at 06:18

## 2018-10-18 RX ADMIN — HYDROCODONE BITARTRATE AND ACETAMINOPHEN 1 TABLET: 5; 325 TABLET ORAL at 14:02

## 2018-10-18 RX ADMIN — ENOXAPARIN SODIUM 40 MG: 100 INJECTION SUBCUTANEOUS at 06:19

## 2018-10-18 RX ADMIN — DIVALPROEX SODIUM 250 MG: 125 CAPSULE, COATED PELLETS ORAL at 17:53

## 2018-10-18 RX ADMIN — Medication 5 DROP: at 17:53

## 2018-10-18 ASSESSMENT — COGNITIVE AND FUNCTIONAL STATUS - GENERAL
DRESSING REGULAR UPPER BODY CLOTHING: A LOT
DAILY ACTIVITIY SCORE: 9
TOILETING: TOTAL
DRESSING REGULAR LOWER BODY CLOTHING: TOTAL
EATING MEALS: A LOT
HELP NEEDED FOR BATHING: TOTAL
SUGGESTED CMS G CODE MODIFIER DAILY ACTIVITY: CL
PERSONAL GROOMING: A LOT

## 2018-10-18 ASSESSMENT — PAIN SCALES - GENERAL: PAINLEVEL_OUTOF10: 0

## 2018-10-18 NOTE — PROGRESS NOTES
· 2 RN skin check complete with Mary   · Blanchable redness to sacrum, scab on left elbow  · Devices in place: nasal canula, glasses, restraints, hearing aids  · Skin assessed under devices: redness on tops of ears and under hearing aids. Glasses removed for sleep, foam repositioned  · No confirmed pressure ulcers found  · No potential pressure ulcers noted  · The following interventions in place: waffle overlay, repositioning every 2 hours, pillows for repositioning, foam under O2 tubing, sussy cream, barrier wipes, frequent checks for incontinent episodes

## 2018-10-18 NOTE — THERAPY
"Occupational Therapy Treatment completed with focus on ADLs and ADL transfers.  Functional Status:  Total A x2 supine to sit.  Pt with posterior push initially when sitting EOB.  Pt agitated with OOB activity and stating, \"You're a bastard.\"  Pt combed hair, washed face, and brushed teeth with mod A.  Total A x2 to return to supine.  Pt with advanced dementia and is likely close to baseline with self-care.    Plan of Care: Will not actively follow pt at this time but will remain available for DC needs.  Discharge Recommendations:  Equipment Will Continue to Assess for Equipment Needs.     See \"Rehab Therapy-Acute\" Patient Summary Report for complete documentation.   "

## 2018-10-18 NOTE — PROGRESS NOTES
Patient is AOx1, only oriented to self. Reoriented patient. Patient attempting to fling her legs over side rail and get out of bed. Repositioned and changed patient. Plan of care discussed.Nodded understanding. Denies pain. On  2 L of O2 per NC. Call light in use, belongings within reach, bed alarm on, treaded socks on, bed locked in low position, restraints on
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
## 2018-10-18 NOTE — CARE PLAN
Problem: Communication  Goal: The ability to communicate needs accurately and effectively will improve    Intervention: Use communication aids and/or /Language Line as appropriate  Spoke slower and lower, hearing aids in, used nonverbal communication      Problem: Skin Integrity  Goal: Risk for impaired skin integrity will decrease    Intervention: Implement precautions to protect skin integrity in collaboration with the interdisciplinary team  2 RN skin check. [place] cream, repositioning

## 2018-10-19 PROCEDURE — 700111 HCHG RX REV CODE 636 W/ 250 OVERRIDE (IP): Performed by: HOSPITALIST

## 2018-10-19 PROCEDURE — A9270 NON-COVERED ITEM OR SERVICE: HCPCS | Performed by: HOSPITALIST

## 2018-10-19 PROCEDURE — 770006 HCHG ROOM/CARE - MED/SURG/GYN SEMI*

## 2018-10-19 PROCEDURE — 700102 HCHG RX REV CODE 250 W/ 637 OVERRIDE(OP): Performed by: HOSPITALIST

## 2018-10-19 PROCEDURE — 92526 ORAL FUNCTION THERAPY: CPT

## 2018-10-19 PROCEDURE — 99233 SBSQ HOSP IP/OBS HIGH 50: CPT | Mod: 25 | Performed by: HOSPITALIST

## 2018-10-19 PROCEDURE — 99497 ADVNCD CARE PLAN 30 MIN: CPT | Performed by: HOSPITALIST

## 2018-10-19 RX ADMIN — SENNOSIDES AND DOCUSATE SODIUM 2 TABLET: 8.6; 5 TABLET ORAL at 17:12

## 2018-10-19 RX ADMIN — DIVALPROEX SODIUM 250 MG: 125 CAPSULE, COATED PELLETS ORAL at 17:12

## 2018-10-19 RX ADMIN — Medication 5 DROP: at 19:37

## 2018-10-19 RX ADMIN — ENOXAPARIN SODIUM 40 MG: 100 INJECTION SUBCUTANEOUS at 05:05

## 2018-10-19 RX ADMIN — DIVALPROEX SODIUM 250 MG: 125 CAPSULE, COATED PELLETS ORAL at 05:05

## 2018-10-19 RX ADMIN — LORAZEPAM 0.5 MG: 0.5 TABLET ORAL at 21:58

## 2018-10-19 ASSESSMENT — PAIN SCALES - PAIN ASSESSMENT IN ADVANCED DEMENTIA (PAINAD)
TOTALSCORE: 0
CONSOLABILITY: NO NEED TO CONSOLE
BREATHING: NORMAL
CONSOLABILITY: NO NEED TO CONSOLE
TOTALSCORE: 0
CONSOLABILITY: NO NEED TO CONSOLE
FACIALEXPRESSION: SMILING OR INEXPRESSIVE
BODYLANGUAGE: RELAXED
TOTALSCORE: 0
BREATHING: NORMAL
BREATHING: NORMAL
BODYLANGUAGE: RELAXED
FACIALEXPRESSION: SMILING OR INEXPRESSIVE
BODYLANGUAGE: RELAXED
FACIALEXPRESSION: SMILING OR INEXPRESSIVE

## 2018-10-19 ASSESSMENT — PAIN SCALES - GENERAL
PAINLEVEL_OUTOF10: 2
PAINLEVEL_OUTOF10: 0
PAINLEVEL_OUTOF10: 2
PAINLEVEL_OUTOF10: 0

## 2018-10-19 NOTE — PROGRESS NOTES
AAOx1, self - dementia. Declining pain intervention at this time, resting comfortably in bed. +BS in all 4 quadrants, hyperactive. Bilateral wrist restraints in use, restraint order current. 2x person assist, unsteady. Incontinent. POC discussed, denies further needs at this time. Bed alarm on, call light within reach & hourly rounding in place.

## 2018-10-19 NOTE — CARE PLAN
Problem: Safety  Goal: Will remain free from injury  Outcome: PROGRESSING AS EXPECTED  Frequent patient checks per RN/CNA. Fall precautions maintained.     Problem: Respiratory:  Goal: Respiratory status will improve  Outcome: PROGRESSING AS EXPECTED  Lungs clear. Educated on deep breathing, coughing, incentive spirometer. Will need encouragement.

## 2018-10-19 NOTE — CARE PLAN
Problem: Communication  Goal: The ability to communicate needs accurately and effectively will improve    Intervention: Educate patient and significant other/support system about the plan of care, procedures, treatments, medications and allow for questions  Used visual cues to help communication. Able to follow commands       Problem: Skin Integrity  Goal: Risk for impaired skin integrity will decrease    Intervention: Implement precautions to protect skin integrity in collaboration with the interdisciplinary team  2 RN skin check. Frequent repositioning, sussy cream, encourage good nutrition
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
## 2018-10-19 NOTE — ACP (ADVANCE CARE PLANNING)
Advance Care Planning Note    Discussion date:  10/19/2018  Discussion participants:  Son Tian    The patient wishes to discuss Advanced Care Planning today and the following is a brief summary of our discussion.    Patient does not have  capacity to make their own medical decisions.  Health Care Agent/Surrogate Decision Maker documented in chart.    Documents of Advance Directives:  POLST and Durable Power of  for Healthcare    Communication of relevant diagnoses: Son asking about prognosis, discussed worsening dementia, agitation, weak swallow, aspiration risk    Communication of prognosis: guarded    Communication of treatment goals/options: continue current plan, Tian wanting to be sure her POLST directives     Treatment decisions:  Continue DNR/DNI, limited interventions, ok with IV fluids and ABX, no PEG tube    Code status:  do not attempt resuscitation (DNAR), no intubations    The patient's family would like:   Life-sustaining antibiotics: antibiotics by IV as necessary   Artificially administered fluids: long-term IV fluids   Artificially administered nutrition: no feeding tube   Other limitations of medical interventions: no dialysis     Time statement:  I discussed advance care planning with the patient's family for at least 20 minutes, including diagnosis, prognosis, plan of care, risks and benefits of any therapies that could be offered, as well as alternatives including palliation and hospice, as appropriate, exclusive of evaluation and management or other separately billable procedures.    Jose Francisco Olsen M.D.

## 2018-10-19 NOTE — PROGRESS NOTES
· 2 RN skin check complete with Mary  · Redness to sacrum  · Devices in place: nasal canula, glasses, hearing aids  · Skin assessed under devices: redness on tops of ears and under hearing aids  · The following interventions in place: 2 Rn skin checks, q 2 hour turns, sussy cream, frequent checks for incontinence, foam protectors on O2

## 2018-10-19 NOTE — THERAPY
"Speech Language Therapy dysphagia treatment completed.   Functional Status: Patient sleeping, but rousing to verbal and tactile cues and confused, but pleasant and cooperative during tx session. Patient currently on Dys1/NTL diet and seen with Dys1/NTL breakfast tray. Patient consumed approximately 95% of breakfast tray with this SLP with no overt s/sx of aspiration during entire tx session. Patient had prolonged oral holding up to 10 seconds at times, but triggered a swallow with moderate verbal cues. Patient still requires 1:1 feeding d/t impulsivity and confusion. Laryngeal elevation palpated as weak. Patient consumed PO trials of thins via cup sip and presented with increased WOB and exhalatory stridor, which is concerning for penetration/aspiration. At this time, recommend patient continue Dys1/NTL diet with 1:1 feeding. Crush meds in puree. Okay for straws. RN aware. SLP is following.     Recommendations: At this time, recommend patient continue Dys1/NTL diet with 1:1 feeding. Crush meds in puree. Okay for straws.  Plan of Care: Will benefit from Speech Therapy 3 times per week  Post-Acute Therapy: Discharge to an inpatient transitional care facility for continued speech therapy services.    See \"Rehab Therapy-Acute\" Patient Summary Report for complete documentation.     "

## 2018-10-19 NOTE — PROGRESS NOTES
Patient is calm, Q2 turning maintained. No signs of distress or pain. Educated on deep breathing and use of incentive spirometer, however, this will require extra encouragement. Fall precautions maintained, frequent RN/CNA checks.

## 2018-10-19 NOTE — PROGRESS NOTES
Patient is AOx1, only oriented to self. Reoriented patient. Plan of care discussed. Nodded understanding. Denies pain.  On  2 L of O2 per NC. Call light in use, belongings within reach, bed alarm on, treaded socks on, bed locked in low position

## 2018-10-20 PROCEDURE — A9270 NON-COVERED ITEM OR SERVICE: HCPCS | Performed by: HOSPITALIST

## 2018-10-20 PROCEDURE — 770006 HCHG ROOM/CARE - MED/SURG/GYN SEMI*

## 2018-10-20 PROCEDURE — 99232 SBSQ HOSP IP/OBS MODERATE 35: CPT | Performed by: HOSPITALIST

## 2018-10-20 PROCEDURE — 700102 HCHG RX REV CODE 250 W/ 637 OVERRIDE(OP): Performed by: HOSPITALIST

## 2018-10-20 PROCEDURE — 700111 HCHG RX REV CODE 636 W/ 250 OVERRIDE (IP): Performed by: HOSPITALIST

## 2018-10-20 RX ADMIN — DIVALPROEX SODIUM 250 MG: 125 CAPSULE, COATED PELLETS ORAL at 18:12

## 2018-10-20 RX ADMIN — LORAZEPAM 0.5 MG: 0.5 TABLET ORAL at 23:12

## 2018-10-20 RX ADMIN — Medication 5 DROP: at 18:00

## 2018-10-20 RX ADMIN — DIVALPROEX SODIUM 250 MG: 125 CAPSULE, COATED PELLETS ORAL at 05:10

## 2018-10-20 RX ADMIN — HYDROCODONE BITARTRATE AND ACETAMINOPHEN 1 TABLET: 5; 325 TABLET ORAL at 09:51

## 2018-10-20 RX ADMIN — HYDROCODONE BITARTRATE AND ACETAMINOPHEN 1 TABLET: 5; 325 TABLET ORAL at 19:32

## 2018-10-20 ASSESSMENT — PAIN SCALES - PAIN ASSESSMENT IN ADVANCED DEMENTIA (PAINAD)
FACIALEXPRESSION: SMILING OR INEXPRESSIVE
CONSOLABILITY: NO NEED TO CONSOLE
FACIALEXPRESSION: SAD, FRIGHTENED, FROWN
BODYLANGUAGE: TENSE, DISTRESSED PACING, FIDGETING
BREATHING: NORMAL
CONSOLABILITY: DISTRACTED OR REASSURED BY VOICE/TOUCH
BODYLANGUAGE: RELAXED
TOTALSCORE: 2
FACIALEXPRESSION: SMILING OR INEXPRESSIVE
CONSOLABILITY: NO NEED TO CONSOLE
TOTALSCORE: 0
FACIALEXPRESSION: SMILING OR INEXPRESSIVE
BREATHING: NORMAL
NEGVOCALIZATION: OCCASIONAL MOAN/GROAN, LOW SPEECH, NEGATIVE/DISAPPROVING QUALITY
BODYLANGUAGE: RELAXED
BODYLANGUAGE: TENSE, DISTRESSED PACING, FIDGETING
NEGVOCALIZATION: OCCASIONAL MOAN/GROAN, LOW SPEECH, NEGATIVE/DISAPPROVING QUALITY
BREATHING: NORMAL
CONSOLABILITY: NO NEED TO CONSOLE
TOTALSCORE: 4
BREATHING: NORMAL
TOTALSCORE: 0

## 2018-10-20 ASSESSMENT — PAIN SCALES - GENERAL: PAINLEVEL_OUTOF10: 2

## 2018-10-20 NOTE — PROGRESS NOTES
· 2 RN skin check complete with Donte  · Blanchable redness to sacrum  · Devices in place: nasal canula  · Skin assessed under devices: redness to tops of ears and sides of nose  · The following interventions in place: sussy cream, q 2 hour turns, frequent checks for incontinence, pillows for positioning, glasses, and hearing aids removed
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
## 2018-10-20 NOTE — CARE PLAN
Problem: Safety  Goal: Will remain free from falls    Intervention: Implement fall precautions  Patient continues to attempt to get out of bed without assistance. Educated, no evidence of understanding. Call light in reach, bed alarm on, rails up, belongings with in reach, care channel on, repositioned      Problem: Psychosocial Needs:  Goal: Level of anxiety will decrease    Intervention: Identify and develop with patient strategies to cope with anxiety triggers  Patient yelling at staff and making unsafe attempts to ambulate. Patient given ativan.

## 2018-10-20 NOTE — PROGRESS NOTES
Patient is AOx1, only oriented to self. Reorientation unsuccessful. Plan of care discussed. No evidence of understanding. Denies pain. On  2 L of O2 per NC. Call light in use, belongings within reach, bed alarm on, treaded socks on, bed locked in low position

## 2018-10-21 PROCEDURE — 99232 SBSQ HOSP IP/OBS MODERATE 35: CPT | Performed by: HOSPITALIST

## 2018-10-21 PROCEDURE — 700102 HCHG RX REV CODE 250 W/ 637 OVERRIDE(OP): Performed by: HOSPITALIST

## 2018-10-21 PROCEDURE — 770006 HCHG ROOM/CARE - MED/SURG/GYN SEMI*

## 2018-10-21 PROCEDURE — 700111 HCHG RX REV CODE 636 W/ 250 OVERRIDE (IP): Performed by: HOSPITALIST

## 2018-10-21 PROCEDURE — A9270 NON-COVERED ITEM OR SERVICE: HCPCS | Performed by: HOSPITALIST

## 2018-10-21 RX ADMIN — QUETIAPINE FUMARATE 25 MG: 25 TABLET ORAL at 16:03

## 2018-10-21 RX ADMIN — DIVALPROEX SODIUM 250 MG: 125 CAPSULE, COATED PELLETS ORAL at 18:17

## 2018-10-21 RX ADMIN — LORAZEPAM 0.5 MG: 0.5 TABLET ORAL at 15:06

## 2018-10-21 RX ADMIN — DIVALPROEX SODIUM 250 MG: 125 CAPSULE, COATED PELLETS ORAL at 05:47

## 2018-10-21 RX ADMIN — HYDROCODONE BITARTRATE AND ACETAMINOPHEN 1 TABLET: 5; 325 TABLET ORAL at 19:49

## 2018-10-21 RX ADMIN — Medication 5 DROP: at 19:43

## 2018-10-21 RX ADMIN — ENOXAPARIN SODIUM 40 MG: 100 INJECTION SUBCUTANEOUS at 05:48

## 2018-10-21 ASSESSMENT — PAIN SCALES - GENERAL: PAINLEVEL_OUTOF10: ASSUMED PAIN PRESENT

## 2018-10-21 NOTE — DISCHARGE PLANNING
Received Choice form at 0509  Agency/Facility Name: [facility]  Referral sent per Choice form @ 6744

## 2018-10-21 NOTE — PROGRESS NOTES
· 2 RN skin check complete.   · Devices in place NC.  · Mild redness to perianal area  · Small old scab below left elbow  · The following interventions in place: q 2hr turns, sussy cream to buttocks and groin, gray foam on O2 tubing, pillows for positioning and floating heels..
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
## 2018-10-21 NOTE — CARE PLAN
Problem: Safety  Goal: Will remain free from injury  Outcome: PROGRESSING AS EXPECTED  Fall precautions maintained. Bed alarm. Q2 turning. Skin clean, dry, intact.     Problem: Pain Management  Goal: Pain level will decrease to patient's comfort goal  Outcome: PROGRESSING AS EXPECTED  Resting/sleeping.

## 2018-10-22 PROCEDURE — 99232 SBSQ HOSP IP/OBS MODERATE 35: CPT | Performed by: HOSPITALIST

## 2018-10-22 PROCEDURE — 700102 HCHG RX REV CODE 250 W/ 637 OVERRIDE(OP): Performed by: HOSPITALIST

## 2018-10-22 PROCEDURE — A9270 NON-COVERED ITEM OR SERVICE: HCPCS | Performed by: HOSPITALIST

## 2018-10-22 PROCEDURE — 97530 THERAPEUTIC ACTIVITIES: CPT

## 2018-10-22 PROCEDURE — 770006 HCHG ROOM/CARE - MED/SURG/GYN SEMI*

## 2018-10-22 PROCEDURE — 700111 HCHG RX REV CODE 636 W/ 250 OVERRIDE (IP): Performed by: HOSPITALIST

## 2018-10-22 RX ADMIN — DIVALPROEX SODIUM 250 MG: 125 CAPSULE, COATED PELLETS ORAL at 04:59

## 2018-10-22 RX ADMIN — Medication 5 DROP: at 21:34

## 2018-10-22 RX ADMIN — HYDROCODONE BITARTRATE AND ACETAMINOPHEN 1 TABLET: 5; 325 TABLET ORAL at 04:59

## 2018-10-22 RX ADMIN — LORAZEPAM 0.5 MG: 0.5 TABLET ORAL at 01:44

## 2018-10-22 RX ADMIN — HYDROCODONE BITARTRATE AND ACETAMINOPHEN 1 TABLET: 5; 325 TABLET ORAL at 14:23

## 2018-10-22 RX ADMIN — DIVALPROEX SODIUM 250 MG: 125 CAPSULE, COATED PELLETS ORAL at 18:34

## 2018-10-22 RX ADMIN — QUETIAPINE FUMARATE 25 MG: 25 TABLET ORAL at 23:02

## 2018-10-22 RX ADMIN — LORAZEPAM 0.5 MG: 0.5 TABLET ORAL at 18:34

## 2018-10-22 RX ADMIN — ENOXAPARIN SODIUM 40 MG: 100 INJECTION SUBCUTANEOUS at 04:59

## 2018-10-22 ASSESSMENT — COGNITIVE AND FUNCTIONAL STATUS - GENERAL
MOVING FROM LYING ON BACK TO SITTING ON SIDE OF FLAT BED: UNABLE
TURNING FROM BACK TO SIDE WHILE IN FLAT BAD: A LOT
SUGGESTED CMS G CODE MODIFIER MOBILITY: CM
WALKING IN HOSPITAL ROOM: TOTAL
CLIMB 3 TO 5 STEPS WITH RAILING: TOTAL
MOVING TO AND FROM BED TO CHAIR: A LOT
MOBILITY SCORE: 8
STANDING UP FROM CHAIR USING ARMS: TOTAL

## 2018-10-22 ASSESSMENT — PAIN SCALES - PAIN ASSESSMENT IN ADVANCED DEMENTIA (PAINAD)
BREATHING: NORMAL
TOTALSCORE: 0
CONSOLABILITY: NO NEED TO CONSOLE
TOTALSCORE: 0
BREATHING: NORMAL
CONSOLABILITY: NO NEED TO CONSOLE
BODYLANGUAGE: RELAXED
BODYLANGUAGE: RELAXED
FACIALEXPRESSION: SMILING OR INEXPRESSIVE
FACIALEXPRESSION: SMILING OR INEXPRESSIVE

## 2018-10-22 ASSESSMENT — PAIN SCALES - GENERAL
PAINLEVEL_OUTOF10: 7
PAINLEVEL_OUTOF10: 0
PAINLEVEL_OUTOF10: 0

## 2018-10-22 ASSESSMENT — GAIT ASSESSMENTS: GAIT LEVEL OF ASSIST: UNABLE TO PARTICIPATE

## 2018-10-22 NOTE — CARE PLAN
Problem: Safety  Goal: Will remain free from falls  Outcome: PROGRESSING AS EXPECTED  Bed alarm is on, safety maintained

## 2018-10-22 NOTE — PROGRESS NOTES
· 2 RN skin check complete with Kate  · No open areas noted, skin intact on all pressure points  · Devices in place: nasal canula  · Skin assessed under devices: redness to tops of ears and sides of nose  · The following interventions in place: sussy cream, q 2 hour turns, frequent checks for incontinence and pillows for positioning
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
## 2018-10-22 NOTE — DISCHARGE PLANNING
Agency/Facility Name: [facility]  Outcome: Patient declined due to no open beds.    Agency/Facility Name: [facility]  Spoke To: Kimberly  Outcome: Patient declined due to no long term beds.    Agency/Facility Name: [facility]  Spoke To: Jazlyn  Outcome: Patient declined due to no long term beds.    Agency/Facility Name: [facility]  Outcome: Patient declined due to behaviors and care exceeds capacity.    Agency/Facility Name: [facility]  Spoke To: Faby  Outcome: Informed [facility] patient is most likely long term, they will call back with update on acceptance/denial.  [facility] informed me that for long term patient needs Medicaid or pay privately at $350 for a semi private and $410 for a private room with one month down payment.    [facility] Name[facility]  Spoke To: ABDOULAYE for B[facility]u  Outcome: Awaiting call back.    Agency/Facility Name: [facility]  Spoke To: Kimberly  Outcome: Patient declined due to long term care.    [facility]  Spoke To: Narciso  Outcome: Patient declined due to no long term beds.    [facility]Facility Name[facility]  Spoke To: Omayra   Outcome: Patient declined due to no long term beds.    @1400  Agency/Facility Name: [facility]  Spoke To: Bhanu  Outcome: Having DO review referral.    @1500  [facility]  Spoke To: ABDOULAYE for [facility]  Outcome: Awaiting call back.    STEPHEN Saravia informed.

## 2018-10-22 NOTE — THERAPY
"Physical Therapy Treatment completed.   Bed Mobility:  Supine to Sit: Moderate Assist  Transfers: Sit to Stand: Total Assist  Gait: Level Of Assist: Unable to Participate with Front-Wheel Walker       Plan of Care: Will benefit from Physical Therapy 3 times per week  Discharge Recommendations: Equipment: Will Continue to Assess for Equipment Needs. Post-acute therapy Discharge to a transitional care facility for continued skilled therapy services.     See \"Rehab Therapy-Acute\" Patient Summary Report for complete documentation.       "

## 2018-10-23 PROCEDURE — 99231 SBSQ HOSP IP/OBS SF/LOW 25: CPT | Performed by: FAMILY MEDICINE

## 2018-10-23 PROCEDURE — 36415 COLL VENOUS BLD VENIPUNCTURE: CPT

## 2018-10-23 PROCEDURE — 700111 HCHG RX REV CODE 636 W/ 250 OVERRIDE (IP): Performed by: HOSPITALIST

## 2018-10-23 PROCEDURE — A9270 NON-COVERED ITEM OR SERVICE: HCPCS | Performed by: HOSPITALIST

## 2018-10-23 PROCEDURE — 86480 TB TEST CELL IMMUN MEASURE: CPT

## 2018-10-23 PROCEDURE — 700102 HCHG RX REV CODE 250 W/ 637 OVERRIDE(OP): Performed by: HOSPITALIST

## 2018-10-23 PROCEDURE — 92526 ORAL FUNCTION THERAPY: CPT

## 2018-10-23 PROCEDURE — 770006 HCHG ROOM/CARE - MED/SURG/GYN SEMI*

## 2018-10-23 RX ADMIN — ENOXAPARIN SODIUM 40 MG: 100 INJECTION SUBCUTANEOUS at 05:31

## 2018-10-23 RX ADMIN — HYDROCODONE BITARTRATE AND ACETAMINOPHEN 1 TABLET: 5; 325 TABLET ORAL at 12:56

## 2018-10-23 RX ADMIN — QUETIAPINE FUMARATE 25 MG: 25 TABLET ORAL at 16:03

## 2018-10-23 RX ADMIN — LORAZEPAM 0.5 MG: 0.5 TABLET ORAL at 14:46

## 2018-10-23 RX ADMIN — Medication 5 DROP: at 20:04

## 2018-10-23 RX ADMIN — DIVALPROEX SODIUM 250 MG: 125 CAPSULE, COATED PELLETS ORAL at 17:08

## 2018-10-23 RX ADMIN — DIVALPROEX SODIUM 250 MG: 125 CAPSULE, COATED PELLETS ORAL at 05:31

## 2018-10-23 ASSESSMENT — PAIN SCALES - PAIN ASSESSMENT IN ADVANCED DEMENTIA (PAINAD)
BREATHING: NORMAL
TOTALSCORE: 0
FACIALEXPRESSION: SAD, FRIGHTENED, FROWN
CONSOLABILITY: NO NEED TO CONSOLE
BREATHING: NORMAL
BODYLANGUAGE: RELAXED
BREATHING: NORMAL
BODYLANGUAGE: TENSE, DISTRESSED PACING, FIDGETING
NEGVOCALIZATION: OCCASIONAL MOAN/GROAN, LOW SPEECH, NEGATIVE/DISAPPROVING QUALITY
FACIALEXPRESSION: SMILING OR INEXPRESSIVE
CONSOLABILITY: NO NEED TO CONSOLE
TOTALSCORE: 3
TOTALSCORE: 0
CONSOLABILITY: NO NEED TO CONSOLE
FACIALEXPRESSION: SMILING OR INEXPRESSIVE
BODYLANGUAGE: RELAXED

## 2018-10-23 ASSESSMENT — PAIN SCALES - GENERAL
PAINLEVEL_OUTOF10: 0
PAINLEVEL_OUTOF10: 5
PAINLEVEL_OUTOF10: 0

## 2018-10-23 NOTE — CARE PLAN
Problem: Respiratory:  Goal: Respiratory status will improve  Outcome: PROGRESSING SLOWER THAN EXPECTED  Pt encouraged to deep breath and cough, pt needs coaching, and is able to follow commands at times. Pt is on 2L oxygen NC.     Problem: Psychosocial Needs:  Goal: Level of anxiety will decrease  Outcome: PROGRESSING SLOWER THAN EXPECTED  Pt agitated and anxious, pt trying to kick RN and CNA. RN administered PRN Seroquel PO.     Problem: Skin Integrity  Goal: Risk for impaired skin integrity will decrease  Outcome: PROGRESSING AS EXPECTED  Q2 turns in place to prevent risk of impaired skin integrity. Merced cream applied, waffle overlay in place, pillows in place for repositioning and floating heels.

## 2018-10-23 NOTE — DISCHARGE PLANNING
Medical Social Work  PC from patients son, Tian who stated he called [facility] and they do have an opening.  Tian stated they will send out a nurse either today/tomorrow to assess her for placement.   Requested to be called after nurse assess patient.

## 2018-10-23 NOTE — DISCHARGE PLANNING
Agency/Facility Name: [facility]  Spoke To:  for Bhanu  Outcome: Awaiting call back.    Agency[facility]Facility Name: [facility]  Spoke To: Bhanu  Outcome: Patient declined due to long term care.

## 2018-10-23 NOTE — PROGRESS NOTES
· 2 RN skin check complete with Katie GENAO  · Devices in place Nasal Cannula.  · Skin assessed under devices Redness on top of ears bilaterally, blanching.  · The following interventions in place: Merced cream applied, q2 Turns in place, frequent checks for incontinence, pillows in us for repositioning and floating heels, and waffle overlay in place.

## 2018-10-23 NOTE — THERAPY
"Speech Language Therapy dysphagia treatment completed.   Functional Status:  Patient currently on Dys1/NTL diet with 1:1 feeding and per RN, patient appears to be tolerating diet without difficulty. Patient awake, alert, and confused, but cooperative. Patient consumed PO trials of pudding, soft solids, and NTL via cup sip and straw. Patient had prolonged mastication on soft solids and eventually needed to spit out soft solids d/t inability to masticate. No other overt s/sx of aspiration on any consistencies trialed. Patient consumed meds crushed in pudding with no s/sx of aspiration, but required minimal verbal cues to swallow quickly. Laryngeal elevation palpated as weak, but patient unable to follow simple directives for exercises. At this time, recommend patient continue Dys1/NTL diet with 1:1 feeding. RN aware. SLP is following 1-2 more times for diet tolerance, as suspect this is patient's new baseline diet d/t advanced dementia and poor dentition.     Recommendations: At this time, recommend patient continue Dys1/NTL diet with 1:1 feeding. SLP is following 1-2 more times for diet tolerance, as suspect this is patient's new baseline diet d/t advanced dementia and poor dentition. Crush meds in applesauce.  Plan of Care: Will benefit from Speech Therapy 3 times per week  Post-Acute Therapy: Discharge to an inpatient transitional care facility for continued speech therapy services.    See \"Rehab Therapy-Acute\" Patient Summary Report for complete documentation.     "

## 2018-10-23 NOTE — PROGRESS NOTES
Pt A/O x 1 to self. Reoriented pt during shift. Pt is hard of hearing. No complaints of pain or discomfort. Assessment completed and scheduled medications administered per MAR. Plan of care updated and discussed with pt. Pt on 2L of oxygen NC. Q2 turns in place and pillows in use for repositioning. Pt is incontinent. Needs addressed at bedside. Bed in lowest position and locked. Call light and belongings within reach. Bed alarm on and working.
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
## 2018-10-23 NOTE — PROGRESS NOTES
Prn pain medication given for generalized pain. Patient found several times throughout shift trying to get out of bed. Repositioned patient every two hours. Incontinent. Alert and oriented to self only. Both bed alarms on at all times. Frequent rounding.

## 2018-10-24 PROCEDURE — A9270 NON-COVERED ITEM OR SERVICE: HCPCS | Performed by: HOSPITALIST

## 2018-10-24 PROCEDURE — 90662 IIV NO PRSV INCREASED AG IM: CPT | Performed by: FAMILY MEDICINE

## 2018-10-24 PROCEDURE — 3E02340 INTRODUCTION OF INFLUENZA VACCINE INTO MUSCLE, PERCUTANEOUS APPROACH: ICD-10-PCS | Performed by: FAMILY MEDICINE

## 2018-10-24 PROCEDURE — 90471 IMMUNIZATION ADMIN: CPT

## 2018-10-24 PROCEDURE — 90670 PCV13 VACCINE IM: CPT | Performed by: FAMILY MEDICINE

## 2018-10-24 PROCEDURE — 700111 HCHG RX REV CODE 636 W/ 250 OVERRIDE (IP): Performed by: FAMILY MEDICINE

## 2018-10-24 PROCEDURE — 770006 HCHG ROOM/CARE - MED/SURG/GYN SEMI*

## 2018-10-24 PROCEDURE — 99231 SBSQ HOSP IP/OBS SF/LOW 25: CPT | Performed by: FAMILY MEDICINE

## 2018-10-24 PROCEDURE — 700111 HCHG RX REV CODE 636 W/ 250 OVERRIDE (IP): Performed by: HOSPITALIST

## 2018-10-24 PROCEDURE — 307059 PAD,EAR PROTECTOR: Performed by: FAMILY MEDICINE

## 2018-10-24 PROCEDURE — 700102 HCHG RX REV CODE 250 W/ 637 OVERRIDE(OP): Performed by: HOSPITALIST

## 2018-10-24 RX ADMIN — QUETIAPINE FUMARATE 25 MG: 25 TABLET ORAL at 21:43

## 2018-10-24 RX ADMIN — QUETIAPINE FUMARATE 25 MG: 25 TABLET ORAL at 06:19

## 2018-10-24 RX ADMIN — DIVALPROEX SODIUM 250 MG: 125 CAPSULE, COATED PELLETS ORAL at 18:09

## 2018-10-24 RX ADMIN — PNEUMOCOCCAL 13-VALENT CONJUGATE VACCINE 0.5 ML: 2.2; 2.2; 2.2; 2.2; 2.2; 4.4; 2.2; 2.2; 2.2; 2.2; 2.2; 2.2; 2.2 INJECTION, SUSPENSION INTRAMUSCULAR at 13:45

## 2018-10-24 RX ADMIN — Medication 5 DROP: at 20:19

## 2018-10-24 RX ADMIN — INFLUENZA A VIRUS A/MICHIGAN/45/2015 X-275 (H1N1) ANTIGEN (FORMALDEHYDE INACTIVATED), INFLUENZA A VIRUS A/SINGAPORE/INFIMH-16-0019/2016 IVR-186 (H3N2) ANTIGEN (FORMALDEHYDE INACTIVATED), AND INFLUENZA B VIRUS B/MARYLAND/15/2016 BX-69A (A B/COLORADO/6/2017-LIKE VIRUS) ANTIGEN (FORMALDEHYDE INACTIVATED) 0.5 ML: 60; 60; 60 INJECTION, SUSPENSION INTRAMUSCULAR at 13:45

## 2018-10-24 RX ADMIN — ENOXAPARIN SODIUM 40 MG: 100 INJECTION SUBCUTANEOUS at 05:13

## 2018-10-24 RX ADMIN — HALOPERIDOL LACTATE 1 MG: 5 INJECTION, SOLUTION INTRAMUSCULAR at 23:33

## 2018-10-24 RX ADMIN — DIVALPROEX SODIUM 250 MG: 125 CAPSULE, COATED PELLETS ORAL at 05:11

## 2018-10-24 ASSESSMENT — PAIN SCALES - PAIN ASSESSMENT IN ADVANCED DEMENTIA (PAINAD)
FACIALEXPRESSION: SMILING OR INEXPRESSIVE
TOTALSCORE: 0
BREATHING: NORMAL
CONSOLABILITY: NO NEED TO CONSOLE
BODYLANGUAGE: RELAXED

## 2018-10-24 ASSESSMENT — LIFESTYLE VARIABLES: ALCOHOL_USE: NO

## 2018-10-24 ASSESSMENT — PAIN SCALES - GENERAL: PAINLEVEL_OUTOF10: 0

## 2018-10-24 NOTE — CARE PLAN
Problem: Safety  Goal: Will remain free from falls  Outcome: PROGRESSING AS EXPECTED  Room in view from nurses station, bed alarm in use, frequent rounding, distraction and redirection with restlessness    Problem: Discharge Barriers/Planning  Goal: Patient's continuum of care needs will be met  Outcome: PROGRESSING AS EXPECTED  Pt accepted to [facility], pending Quantiferon gold results     Problem: Skin Integrity  Goal: Risk for impaired skin integrity will decrease  Outcome: PROGRESSING AS EXPECTED  Pt repositions self often, waffle overlay in use, prompt linen changes with incontinence

## 2018-10-24 NOTE — DISCHARGE PLANNING
Agency/Facility Name: [facility]  Spoke To: Yuki  Outcome: Informed [facility] Patient will not go to them due to private cost for room.

## 2018-10-24 NOTE — CARE PLAN
Problem: Safety  Goal: Will remain free from falls  Outcome: PROGRESSING AS EXPECTED  Both bed alarms in place. Patient frequently trying to get out of bed without assistance. No falls or injuries.    Problem: Pain Management  Goal: Pain level will decrease to patient's comfort goal  Outcome: PROGRESSING AS EXPECTED  No signs or symptoms of pain.

## 2018-10-24 NOTE — PROGRESS NOTES
Norco given for generalized pain x1. Ativan and seroquel given for anxiety and agitation x1 each. Patient tried getting out of bed without assistance multiple times throughout shift. Alert and oriented to self only.

## 2018-10-24 NOTE — PROGRESS NOTES
Pt A/O x 1 to self. Reoriented pt during shift. Denies pain or discomfort at this time. Pt is very sleepy, in and out of sleep. Assessment completed and scheduled medications administered per MAR. Pt is on oxygen 2L NC, no signs of SOB. Q2 turns in place and pillows in use for repositioning. Quantiferon Gold TB taken by lab, after RN educated with pt, now in processing. Needs addressed at this time. Bed in lowest position and locked. Bed alarm on and working. Call light and belongings within reach.
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
## 2018-10-24 NOTE — DISCHARGE PLANNING
Anticipated Discharge Disposition: group home    Action: Chantale Silveira, Clinical Care Coordinator was here to assess the patient, they are willing to take the patient.  Will need physician's packet completed prior to admit.  Provided to patient's nurse.    Barriers to Discharge: physican's statement/quantiferon gold results    Plan: follow up with patient's nurse

## 2018-10-24 NOTE — PROGRESS NOTES
Pt has been mostly calm and pleasant today. Well managed restlessness, have not needed to give pt PRN medication for agitation or anxiety. 1:1 feeder, pt tolerating diet. Needs met at this time.

## 2018-10-24 NOTE — CARE PLAN
Problem: Safety  Goal: Will remain free from injury  Outcome: PROGRESSING AS EXPECTED  Per Felicity Cid pt is high fall risk, pt's room is near nursing station, bed alarm on and working, safety education provided, bed in lowest position and locked, call light and belongings within reach.   Intervention: Provide assistance with mobility  Provided assistance with q2 turns to prevent skin injuries.       Problem: Knowledge Deficit  Goal: Knowledge of disease process/condition, treatment plan, diagnostic tests, and medications will improve  Outcome: PROGRESSING AS EXPECTED  Educated pt about labs, pt refuses labs during day shift, pt allowed lab come to draw up TB test. Pt nodded in agreement.

## 2018-10-24 NOTE — PROGRESS NOTES
· 2 RN skin check janet Morejon RN.   · Devices in place Nasal Cannula.  · Skin assessed under devices Redness on top of ears bilaterally, blanching.  · Skin assessed:  · Calloused heels bilaterally, blanching.  · Sacrum pink, red, blanching.   · The following interventions in place: Merced cream applied, foam placed on nasal cannula to protect ears, q2 turns in place, frequent checks for incontinence, pillows in use for repositioning and floating heels, and waffle overlay in place.

## 2018-10-25 PROCEDURE — 700111 HCHG RX REV CODE 636 W/ 250 OVERRIDE (IP): Performed by: HOSPITALIST

## 2018-10-25 PROCEDURE — 97530 THERAPEUTIC ACTIVITIES: CPT

## 2018-10-25 PROCEDURE — 99231 SBSQ HOSP IP/OBS SF/LOW 25: CPT | Performed by: FAMILY MEDICINE

## 2018-10-25 PROCEDURE — 770006 HCHG ROOM/CARE - MED/SURG/GYN SEMI*

## 2018-10-25 PROCEDURE — 700102 HCHG RX REV CODE 250 W/ 637 OVERRIDE(OP): Performed by: HOSPITALIST

## 2018-10-25 PROCEDURE — 700111 HCHG RX REV CODE 636 W/ 250 OVERRIDE (IP): Performed by: FAMILY MEDICINE

## 2018-10-25 RX ORDER — ONDANSETRON 2 MG/ML
4 INJECTION INTRAMUSCULAR; INTRAVENOUS EVERY 8 HOURS PRN
Status: DISCONTINUED | OUTPATIENT
Start: 2018-10-25 | End: 2018-10-27 | Stop reason: HOSPADM

## 2018-10-25 RX ORDER — HALOPERIDOL 5 MG/ML
1 INJECTION INTRAMUSCULAR EVERY 6 HOURS PRN
Status: DISCONTINUED | OUTPATIENT
Start: 2018-10-25 | End: 2018-10-27 | Stop reason: HOSPADM

## 2018-10-25 RX ADMIN — DIVALPROEX SODIUM 250 MG: 125 CAPSULE, COATED PELLETS ORAL at 17:20

## 2018-10-25 RX ADMIN — HALOPERIDOL LACTATE 1 MG: 5 INJECTION, SOLUTION INTRAMUSCULAR at 17:20

## 2018-10-25 RX ADMIN — DIVALPROEX SODIUM 250 MG: 125 CAPSULE, COATED PELLETS ORAL at 04:41

## 2018-10-25 ASSESSMENT — GAIT ASSESSMENTS: GAIT LEVEL OF ASSIST: UNABLE TO PARTICIPATE

## 2018-10-25 ASSESSMENT — COGNITIVE AND FUNCTIONAL STATUS - GENERAL
STANDING UP FROM CHAIR USING ARMS: A LOT
TURNING FROM BACK TO SIDE WHILE IN FLAT BAD: A LOT
MOVING FROM LYING ON BACK TO SITTING ON SIDE OF FLAT BED: A LOT
SUGGESTED CMS G CODE MODIFIER MOBILITY: CL
MOBILITY SCORE: 10
WALKING IN HOSPITAL ROOM: A LOT
CLIMB 3 TO 5 STEPS WITH RAILING: TOTAL
MOVING TO AND FROM BED TO CHAIR: UNABLE

## 2018-10-25 ASSESSMENT — PAIN SCALES - PAIN ASSESSMENT IN ADVANCED DEMENTIA (PAINAD)
BODYLANGUAGE: RIGID, FISTS CLENCHED, KNEES UP, PUSHING/PULLING AWAY, STRIKES OUT
FACIALEXPRESSION: SMILING OR INEXPRESSIVE
FACIALEXPRESSION: SMILING OR INEXPRESSIVE
BREATHING: NORMAL
CONSOLABILITY: NO NEED TO CONSOLE
TOTALSCORE: 2
BODYLANGUAGE: RIGID, FISTS CLENCHED, KNEES UP, PUSHING/PULLING AWAY, STRIKES OUT
BREATHING: NORMAL
CONSOLABILITY: NO NEED TO CONSOLE
TOTALSCORE: 2

## 2018-10-25 ASSESSMENT — PAIN SCALES - GENERAL: PAINLEVEL_OUTOF10: 0

## 2018-10-25 NOTE — PROGRESS NOTES
Received report from day RN and assumed care of patient at 1900.  Assessed patient and reviewed labs and notes.  Patient is alert and oriented to self only.  Patient restless and agitated overnight.  Patient medicated with seroquel and haldol per MAR; agitation and restlessness unrelieved.  Patient is confused, impulsive, and does not call appropriately.  Safety precautions in place including bed alarm, 3 bed rails, and room by nursing station.  Call light within reach.  Dysphagia I diet with nectar thick liquids; 1:1 feed.  PIV in left forearm is saline locked and flushes with blood return.  Patient board updated.  Hourly rounding in practice.

## 2018-10-25 NOTE — THERAPY
"Physical Therapy Treatment completed.   Bed Mobility:  Supine to Sit: Moderate Assist  Transfers: Sit to Stand: Moderate Assist  Gait: Level Of Assist: Unable to Participate with Front-Wheel Walker       Plan of Care: Will benefit from Physical Therapy 3 times per week  Discharge Recommendations: Equipment: Will Continue to Assess for Equipment Needs.     See \"Rehab Therapy-Acute\" Patient Summary Report for complete documentation.     Pt presenting w/ improved functional mobility from previous tx. Pt required a lot of time to perform mobility d/t poor attention to task. Pt often beginning task then stopping and asking \"what are we doing?\" Pt also very perseverative on waiting for \"my  Kulwinder because we do everything together.\" Pt was easy to redirect to task by distraction. Pt able to hold balance in standing. Pt was unable to coordinate side steps w/out Max verbal, visual and tactile cues. Pt appears to have the strength to perform mobility but is limited cognitively. Will continue to follow to address functional limitations.  "
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
## 2018-10-25 NOTE — PROGRESS NOTES
· 2 RN skin check complete [facility] GENAO.   · Devices in place Nasal Cannula.  · Skin assessed under devices Redness on top of ears bilaterally, blanching.  · Skin assessed:  ? Calloused heels bilaterally, blanching.  ? Sacrum pink, red, blanching.   · The following interventions in place: q2 turns in place, frequent checks for incontinence, barrier cream, pillows in use for repositioning and floating heels, and waffle overlay in place.

## 2018-10-26 LAB
M TB TUBERC IFN-G BLD QL: NEGATIVE
M TB TUBERC IFN-G/MITOGEN IGNF BLD: 0.04
M TB TUBERC IGNF/MITOGEN IGNF CONTROL: 46.32 [IU]/ML
MITOGEN IGNF BCKGRD COR BLD-ACNC: 0.04 [IU]/ML

## 2018-10-26 PROCEDURE — 700102 HCHG RX REV CODE 250 W/ 637 OVERRIDE(OP): Performed by: HOSPITALIST

## 2018-10-26 PROCEDURE — A9270 NON-COVERED ITEM OR SERVICE: HCPCS | Performed by: HOSPITALIST

## 2018-10-26 PROCEDURE — 99231 SBSQ HOSP IP/OBS SF/LOW 25: CPT | Performed by: FAMILY MEDICINE

## 2018-10-26 PROCEDURE — 51798 US URINE CAPACITY MEASURE: CPT

## 2018-10-26 PROCEDURE — 700111 HCHG RX REV CODE 636 W/ 250 OVERRIDE (IP): Performed by: FAMILY MEDICINE

## 2018-10-26 PROCEDURE — 700111 HCHG RX REV CODE 636 W/ 250 OVERRIDE (IP): Performed by: HOSPITALIST

## 2018-10-26 PROCEDURE — 770006 HCHG ROOM/CARE - MED/SURG/GYN SEMI*

## 2018-10-26 RX ADMIN — HALOPERIDOL LACTATE 1 MG: 5 INJECTION, SOLUTION INTRAMUSCULAR at 08:09

## 2018-10-26 RX ADMIN — ENOXAPARIN SODIUM 40 MG: 100 INJECTION SUBCUTANEOUS at 05:23

## 2018-10-26 RX ADMIN — DIVALPROEX SODIUM 250 MG: 125 CAPSULE, COATED PELLETS ORAL at 05:19

## 2018-10-26 RX ADMIN — DIVALPROEX SODIUM 250 MG: 125 CAPSULE, COATED PELLETS ORAL at 18:40

## 2018-10-26 RX ADMIN — SENNOSIDES AND DOCUSATE SODIUM 2 TABLET: 8.6; 5 TABLET ORAL at 05:19

## 2018-10-26 ASSESSMENT — PAIN SCALES - PAIN ASSESSMENT IN ADVANCED DEMENTIA (PAINAD)
CONSOLABILITY: NO NEED TO CONSOLE
BREATHING: NORMAL
TOTALSCORE: 2
BODYLANGUAGE: RIGID, FISTS CLENCHED, KNEES UP, PUSHING/PULLING AWAY, STRIKES OUT
FACIALEXPRESSION: SMILING OR INEXPRESSIVE
FACIALEXPRESSION: SMILING OR INEXPRESSIVE
CONSOLABILITY: NO NEED TO CONSOLE
BREATHING: NORMAL
TOTALSCORE: 2
BODYLANGUAGE: RIGID, FISTS CLENCHED, KNEES UP, PUSHING/PULLING AWAY, STRIKES OUT

## 2018-10-26 ASSESSMENT — PAIN SCALES - GENERAL: PAINLEVEL_OUTOF10: 0

## 2018-10-26 NOTE — PROGRESS NOTES
"Received report and assumed care at 0700 - Patient is AO to self only.  Agitated and frequently throwing legs over bed rails attempting to get out of bed. Calling for \"Kulwinder\" repeatedly.  Three bed rails up for safety.  PRN haldol given.  Assessment complete on room air.  Built in bed alarm and strip alarm on, treaded socks on.  Waffle mattress overlay in place.  Room near nursing station, frequent rounding/visual checks in place.  "
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
## 2018-10-26 NOTE — CARE PLAN
Problem: Safety  Goal: Will remain free from falls  Outcome: PROGRESSING AS EXPECTED  Patient fell yesterday.  Bed locked in lowest position, treaded socks in place, call light within reach, strip alarm and built in bed alarm on, room near nursing station.  Frequent rounding/visual checks in place.    Problem: Discharge Barriers/Planning  Goal: Patient's continuum of care needs will be met  Outcome: PROGRESSING AS EXPECTED  Awaiting results of quantiferon gold.  Patient to d/c to SNF after results are received.

## 2018-10-26 NOTE — PROGRESS NOTES
Bladder scan results 400ml per ortho tech.  Patient had incontinent episode and voided shortly after.

## 2018-10-26 NOTE — CARE PLAN
Problem: Safety  Goal: Will remain free from falls  Outcome: PROGRESSING SLOWER THAN EXPECTED  Patient had fall today.  Bed locked in lowest position, treaded socks in place, call light within reach, bed alarm on, room nearing nursing station, frequent rounding in place.     Problem: Discharge Barriers/Planning  Goal: Patient's continuum of care needs will be met  Outcome: PROGRESSING AS EXPECTED  Pending pending possible d/c to SNF tomorrow.  SW following.

## 2018-10-26 NOTE — PROGRESS NOTES
Pt is alert and oriented to self. Bed locked in lowest position, treaded socks in place, call light within reach, bed alarm on, Three rails up,  room nearing nursing station, frequent rounding in place. Pt attempted to get out of bed three time thi shift. Pt is sleeping on bed now. All needs met at this time.
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
## 2018-10-26 NOTE — PROGRESS NOTES
Spoke with SW.  Awaiting result of quantiferon gold before patient can be discharged to [place].  Spoke with blood bank who stated result will likely be available this evening.  Per SW, patient should be ready to d/c tomorrow.  Son, Chris, called with update

## 2018-10-26 NOTE — PROGRESS NOTES
Patient has not voided since start of shift.  Hospitalist paged, new order for bladder scan received.

## 2018-10-26 NOTE — CARE PLAN
Problem: Communication  Goal: The ability to communicate needs accurately and effectively will improve  Outcome: PROGRESSING AS EXPECTED  Reoriented patient to time place and situation. Educated pt to call for help. No evidence of understanding. Reinforcement needed.     Problem: Safety  Goal: Will remain free from falls  Outcome: PROGRESSING AS EXPECTED  Bed locked in lowest position, treaded socks in place, call light within reach, bed alarm on, three rails up room nearing nursing station, frequent rounding in place.

## 2018-10-27 VITALS
HEART RATE: 72 BPM | OXYGEN SATURATION: 91 % | BODY MASS INDEX: 24.67 KG/M2 | DIASTOLIC BLOOD PRESSURE: 72 MMHG | WEIGHT: 125.66 LBS | SYSTOLIC BLOOD PRESSURE: 118 MMHG | TEMPERATURE: 97 F | HEIGHT: 60 IN | RESPIRATION RATE: 18 BRPM

## 2018-10-27 PROCEDURE — 99239 HOSP IP/OBS DSCHRG MGMT >30: CPT | Performed by: FAMILY MEDICINE

## 2018-10-27 PROCEDURE — 700111 HCHG RX REV CODE 636 W/ 250 OVERRIDE (IP): Performed by: HOSPITALIST

## 2018-10-27 PROCEDURE — A9270 NON-COVERED ITEM OR SERVICE: HCPCS | Performed by: HOSPITALIST

## 2018-10-27 PROCEDURE — 700102 HCHG RX REV CODE 250 W/ 637 OVERRIDE(OP): Performed by: HOSPITALIST

## 2018-10-27 RX ORDER — HYDROCODONE BITARTRATE AND ACETAMINOPHEN 5; 325 MG/1; MG/1
1 TABLET ORAL EVERY 8 HOURS PRN
Qty: 9 TAB | Refills: 0 | Status: SHIPPED | OUTPATIENT
Start: 2018-10-27 | End: 2018-10-30

## 2018-10-27 RX ORDER — ACETAMINOPHEN 325 MG/1
650 TABLET ORAL EVERY 6 HOURS PRN
Qty: 30 TAB | Refills: 0 | Status: SHIPPED | OUTPATIENT
Start: 2018-10-27

## 2018-10-27 RX ORDER — QUETIAPINE FUMARATE 25 MG/1
25 TABLET, FILM COATED ORAL EVERY 8 HOURS PRN
Qty: 30 TAB | Refills: 0 | Status: SHIPPED | OUTPATIENT
Start: 2018-10-27

## 2018-10-27 RX ADMIN — ACETAMINOPHEN 650 MG: 325 TABLET, FILM COATED ORAL at 13:02

## 2018-10-27 RX ADMIN — ENOXAPARIN SODIUM 40 MG: 100 INJECTION SUBCUTANEOUS at 05:38

## 2018-10-27 RX ADMIN — SENNOSIDES AND DOCUSATE SODIUM 2 TABLET: 8.6; 5 TABLET ORAL at 05:39

## 2018-10-27 RX ADMIN — DIVALPROEX SODIUM 250 MG: 125 CAPSULE, COATED PELLETS ORAL at 05:40

## 2018-10-27 ASSESSMENT — PAIN SCALES - PAIN ASSESSMENT IN ADVANCED DEMENTIA (PAINAD)
FACIALEXPRESSION: SMILING OR INEXPRESSIVE
BODYLANGUAGE: RIGID, FISTS CLENCHED, KNEES UP, PUSHING/PULLING AWAY, STRIKES OUT
CONSOLABILITY: NO NEED TO CONSOLE
TOTALSCORE: 2
BREATHING: NORMAL

## 2018-10-27 ASSESSMENT — PAIN SCALES - GENERAL: PAINLEVEL_OUTOF10: 0

## 2018-10-27 NOTE — DISCHARGE PLANNING
Anticipated Discharge Disposition: [facility]    Action: PC to [facility] to verify they have received Physicans Statement and TB results.  Talked to [facility], who verified they have received the p/w and patient is good to come over.  [facility] request for 1:00 transport.  Faxed transport communication form and [facility] to [facility] for a 1:00 transport time.     Barriers to Discharge:  n/a    Plan: contact [facility] when time is verified.

## 2018-10-27 NOTE — CARE PLAN
Problem: Safety  Goal: Will remain free from falls  Outcome: PROGRESSING AS EXPECTED  Bed locked in lowest position, treaded socks in place, call light within reach, bed alarm on, three rails up room nearing nursing station, frequent rounding in place.     Problem: Skin Integrity  Goal: Risk for impaired skin integrity will decrease  Outcome: PROGRESSING AS EXPECTED  Waffle overlay in place. Frequently check patient for incontinence and her needs for bathroom use. Help pt turn every 2 hours

## 2018-10-27 NOTE — DISCHARGE INSTRUCTIONS
Discharge Instructions    Discharged to group home by ambulance with escort. Discharged via ambulance, hospital escort: Yes.  Special equipment needed: Not Applicable    Be sure to schedule a follow-up appointment with your primary care doctor or any specialists as instructed.     Discharge Plan:   Diet Plan: Discussed  Activity Level: Discussed  Confirmed Follow up Appointment: Patient to Call and Schedule Appointment  Confirmed Symptoms Management: Discussed  Medication Reconciliation Updated: Yes  Pneumococcal Vaccine Administered/Refused: Given (See MAR)  Influenza Vaccine Indication: Indicated: 65 years and older  Influenza Vaccine Given - only chart on this line when given: Influenza Vaccine Given (See MAR)    I understand that a diet low in cholesterol, fat, and sodium is recommended for good health. Unless I have been given specific instructions below for another diet, I accept this instruction as my diet prescription.   Other diet: dysphagia 1 with nectar thick liquids    Special Instructions: None    · Is patient discharged on Warfarin / Coumadin?   No     Depression / Suicide Risk    As you are discharged from this [facility] facility, it is important to learn how to keep safe from harming yourself.    Recognize the warning signs:  · Abrupt changes in personality, positive or negative- including increase in energy   · Giving away possessions  · Change in eating patterns- significant weight changes-  positive or negative  · Change in sleeping patterns- unable to sleep or sleeping all the time   · Unwillingness or inability to communicate  · Depression  · Unusual sadness, discouragement and loneliness  · Talk of wanting to die  · Neglect of personal appearance   · Rebelliousness- reckless behavior  · Withdrawal from people/activities they love  · Confusion- inability to concentrate     If you or a loved one observes any of these behaviors or has concerns about self-harm, here's what you can do:  · Talk  about it- your feelings and reasons for harming yourself  · Remove any means that you might use to hurt yourself (examples: pills, rope, extension cords, firearm)  · Get professional help from the community (Mental Health, Substance Abuse, psychological counseling)  · Do not be alone:Call your Safe Contact- someone whom you trust who will be there for you.  · Call your local CRISIS HOTLINE 992-5221 or 125-785-6028  · Call your local Children's Mobile Crisis Response Team [place] (328) 946-8716 or wwwyoublisher.com  · Call the toll free National Suicide Prevention Hotlines   · National Suicide Prevention Lifeline 942-830-ERUG (4178)  · National Hope Line Network 800-SUICIDE (393-6835)
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
## 2018-10-27 NOTE — PROGRESS NOTES
Pt is alert and oriented to self only. Bed locked in lowest position, treaded socks in place, call light within reach, bed alarm on, Three rails up,  room nearing nursing station, frequent rounding in place. Pt constantly trying to get out bed this shift.This RN charted beside her door to watch her closely.  Pt have BM on bed and used her hands to touch the BM and put the BM all over the bed rails. Bed changed and pt cleaned. Pt is confused and combative. Pt is yelling and shouting in her room.  Education provided to Pt. No evidence of understanding.

## 2018-10-27 NOTE — DISCHARGE PLANNING
Medical Social Work  PC to Satya, informed [facility] that patient will be d/c at 1:00 [facility].  PC to patient's son, Tian informed him his mother will be picked up at 1:00.

## 2018-10-27 NOTE — PROGRESS NOTES
Pt discharged to [facility]. IV removed with no complications. Pt wheeled out by [facility]. Pt A&Ox1 at time of DC which is baseline. Report called to Radha at [facility]. All questions answered.

## 2018-10-27 NOTE — DISCHARGE PLANNING
Received Transport Form @ 1039  Spoke to Daisy @ [facility]    Transport is scheduled for 10/27/18 @1300   going to [address]  STEPHEN Saravia advised of transport time..

## 2018-11-25 ENCOUNTER — HOSPITAL ENCOUNTER (EMERGENCY)
Facility: MEDICAL CENTER | Age: 83
End: 2018-11-25
Attending: EMERGENCY MEDICINE
Payer: MEDICARE

## 2018-11-25 VITALS
WEIGHT: 125.66 LBS | SYSTOLIC BLOOD PRESSURE: 141 MMHG | DIASTOLIC BLOOD PRESSURE: 86 MMHG | HEART RATE: 79 BPM | OXYGEN SATURATION: 95 % | RESPIRATION RATE: 18 BRPM | HEIGHT: 60 IN | TEMPERATURE: 98.1 F | BODY MASS INDEX: 24.67 KG/M2

## 2018-11-25 DIAGNOSIS — S01.81XA LACERATION OF FOREHEAD, INITIAL ENCOUNTER: ICD-10-CM

## 2018-11-25 DIAGNOSIS — S09.90XA CLOSED HEAD INJURY, INITIAL ENCOUNTER: ICD-10-CM

## 2018-11-25 PROCEDURE — 99284 EMERGENCY DEPT VISIT MOD MDM: CPT

## 2018-11-25 PROCEDURE — 304217 HCHG IRRIGATION SYSTEM

## 2018-11-25 PROCEDURE — 303747 HCHG EXTRA SUTURE

## 2018-11-25 PROCEDURE — 700101 HCHG RX REV CODE 250

## 2018-11-25 PROCEDURE — 304999 HCHG REPAIR-SIMPLE/INTERMED LEVEL 1

## 2018-11-25 RX ORDER — LIDOCAINE HYDROCHLORIDE AND EPINEPHRINE BITARTRATE 20; .01 MG/ML; MG/ML
INJECTION, SOLUTION SUBCUTANEOUS
Status: COMPLETED
Start: 2018-11-25 | End: 2018-11-25

## 2018-11-25 RX ORDER — LIDOCAINE HYDROCHLORIDE AND EPINEPHRINE BITARTRATE 20; .01 MG/ML; MG/ML
10 INJECTION, SOLUTION SUBCUTANEOUS ONCE
Status: COMPLETED | OUTPATIENT
Start: 2018-11-25 | End: 2018-11-25

## 2018-11-25 RX ADMIN — LIDOCAINE HYDROCHLORIDE AND EPINEPHRINE BITARTRATE 10 ML: 20; .01 INJECTION, SOLUTION SUBCUTANEOUS at 21:45

## 2018-11-25 RX ADMIN — LIDOCAINE HYDROCHLORIDE AND EPINEPHRINE 10 ML: 20; 10 INJECTION, SOLUTION INFILTRATION; PERINEURAL at 21:45

## 2018-11-26 NOTE — ED TRIAGE NOTES
Pt bib [facility] from [facility] after a witnessed glf from toilet. Pt has partial thickness laceration to right eyebrow.  Negative loc, patient is NOT on any blood thinners. Per ems pts son is POA and wants no head ct. Only stiches. Pt has dementia at baseline. Blood pressure 141/86, pulse 85, temperature 36.7 °C (98.1 °F), temperature source Temporal, resp. rate 18, height 1.524 m (5'), weight 57 kg (125 lb 10.6 oz), SpO2 97 %, not currently breastfeeding.    Bleeding is controlled

## 2018-11-26 NOTE — DISCHARGE PLANNING
Medical Social Work    Referral: Transportation Assistance    Intervention: MSW received a call from bedside RN who states that pt is ready to return to [facility].  MSW contacted Kristin with [facility] ([phone]) who states that pt may return.  Per Kristin pt is in room 29 at [facility].  Pt has dementia and will need [facility] transport back to [facility] ([address]).  MSW faxed PCS and facesheet to [facility] and [facility] follow [facility] to arrange transport for 2300.  MSW notified bedside RN of transport time.    Plan: Pt to return to [facility] via [facility] at 2300.

## 2018-11-26 NOTE — ED PROVIDER NOTES
ED Provider Note    CHIEF COMPLAINT  Chief Complaint   Patient presents with   • GLF   • Head Laceration       HPI  Kelly Hou is a 89 y.o. female who presents with head laceration.  Patient was sitting on the bathroom and fell to the side.  There is no loss of consciousness per nursing home staff.  Patient is not vomited.  Patient is poor historian secondary to history of severe dementia.    REVIEW OF SYSTEMS  Review of Systems   Unable to perform ROS: Dementia     See HPI for further details. All other systems are negative.     PAST MEDICAL HISTORY   has a past medical history of Dementia and Hypertension.    SOCIAL HISTORY  Social History     Social History Main Topics   • Smoking status: Never Smoker   • Smokeless tobacco: Never Used   • Alcohol use No   • Drug use: No   • Sexual activity: Not on file       SURGICAL HISTORY  patient denies any surgical history    CURRENT MEDICATIONS  Home Medications    **Home medications have not yet been reviewed for this encounter**         ALLERGIES  No Known Allergies    PHYSICAL EXAM  Physical Exam   Constitutional: She is oriented to person, place, and time. She appears well-developed and well-nourished.   HENT:   Head: Normocephalic and atraumatic.   4 centimeter laceration immediately superior to the right orbital rim without any involvement of the orbital rim or orbit itself.  Rest of schools without any hematomas or underlying bony abnormality.   Eyes: Conjunctivae are normal.   Neck: Normal range of motion. Neck supple.   Cardiovascular: Normal rate and regular rhythm.    Pulmonary/Chest: Effort normal and breath sounds normal.   Abdominal: Soft. Bowel sounds are normal. She exhibits no distension. There is no tenderness. There is no rebound.   Neurological: She is alert and oriented to person, place, and time.   Following commands, strength in upper and lower extremities 5 out of 5 and sensation is intact throughout.  Oriented x1.   Skin: Skin is warm and dry.  No rash noted.   Psychiatric: She has a normal mood and affect. Her behavior is normal.     Laceration Repair Procedure    Indication: Laceration    Location/Description: 4 centimeter laceration immediately superior to the right orbital rim    Procedure: The patient was placed in the appropriate position and anesthesia around the laceration was obtained by infiltration using 2% Lidocaine with epinephrine. The area was then irrigated with normal saline. The laceration was closed with 5-0 Prolene using interrupted sutures. There were no additional lacerations requiring repair. The wound area was then dressed with bacitracin.      Total repaired wound length: 4 cm.     Other Items: Suture count: 10    The patient tolerated the procedure well.    Complications: None      COURSE & MEDICAL DECISION MAKING  Pertinent Labs & Imaging studies reviewed. (See chart for details)  Elderly patient here after fall with head laceration.  Patient is at risk for possible ICH and I would like to evaluate with a head CT.  I have discussed the case with patient's power of , her son, who would like me to defer CT scan at this point.  He would not want his mother to undergo surgery if there was any ICH or surgical pathology otherwise.  He understands that missing a subdural or bleed otherwise or surgical pathology otherwise could be catastrophic and lead to worsening mental status or death.  Patient laceration irrigated and repaired.  The patient will not drink alcohol nor drive with prescribed medications. The patient will return for worsening symptoms and is stable at the time of discharge. The patient verbalizes understanding and will comply.    FINAL IMPRESSION  1.  Blunt head trauma, facial laceration         Electronically signed by: Kulwinder Bruce, 11/25/2018 9:37 PM

## 2021-11-15 NOTE — PROGRESS NOTES
· 2 RN skin check complete with Zaria GENAO.   · Devices in place: Nasal canula, glasses, hearing aids.  · Skin assessed under devices: Redness to tops of ears, blanching. Redness to bridge of nose, blanching. Foam in place to oxygen tubing. Sacral redness, blanching. Scab present to L elbow.  · No onfirmed pressure ulcers found.  · No new potential pressure ulcers.  · The following interventions in place: Q2 turns, waffle mattress overlay, foam on oxygen tubing, sussy cream.   ---

## 2022-09-17 NOTE — PALLIATIVE CARE
Palliative Care follow-up  Son brought back completed POLST form, Dr. Olsen signed. PC RN scanned copy into EMR, original placed on hard chart and to be sent with pt upon discharge.       Plan:   POLST completed, continue to discuss GOC with son Tian as pt's clinical picture progresses.     Thank you for allowing Palliative Care to participate in this patient's care. Please feel free to call [phone] with any questions or concerns.   PAST MEDICAL HISTORY:  Gastritis

## 2024-10-24 NOTE — CARE PLAN
Problem: Safety  Goal: Will remain free from injury  Outcome: PROGRESSING AS EXPECTED  Fall precautions in place. Appropriate signs on doorway. Bedrails up. Bed in lowest position and locked.  Call light and phone within reach.     Problem: Skin Integrity  Goal: Risk for impaired skin integrity will decrease  Outcome: PROGRESSING AS EXPECTED  Chivo score=15. Q2hr turns in place. Pt in 2 point wrist restraints, skin assessed and documented q2hr. Pt cleaned up during incontinent episodes, and skin remained dry throughout shift       Trial of stiolto 2 puffs daily